# Patient Record
Sex: MALE | Race: WHITE | URBAN - METROPOLITAN AREA
[De-identification: names, ages, dates, MRNs, and addresses within clinical notes are randomized per-mention and may not be internally consistent; named-entity substitution may affect disease eponyms.]

---

## 2019-06-19 ENCOUNTER — APPOINTMENT (RX ONLY)
Dept: URBAN - METROPOLITAN AREA CLINIC 112 | Facility: CLINIC | Age: 29
Setting detail: DERMATOLOGY
End: 2019-06-19

## 2019-06-19 DIAGNOSIS — Z80.8 FAMILY HISTORY OF MALIGNANT NEOPLASM OF OTHER ORGANS OR SYSTEMS: ICD-10-CM

## 2019-06-19 DIAGNOSIS — L81.4 OTHER MELANIN HYPERPIGMENTATION: ICD-10-CM

## 2019-06-19 DIAGNOSIS — L73.2 HIDRADENITIS SUPPURATIVA: ICD-10-CM

## 2019-06-19 DIAGNOSIS — L30.4 ERYTHEMA INTERTRIGO: ICD-10-CM

## 2019-06-19 PROCEDURE — ? KOH PREP

## 2019-06-19 PROCEDURE — ? TREATMENT REGIMEN

## 2019-06-19 PROCEDURE — 99202 OFFICE O/P NEW SF 15 MIN: CPT

## 2019-06-19 PROCEDURE — ? PRESCRIPTION

## 2019-06-19 PROCEDURE — ? ADDITIONAL NOTES

## 2019-06-19 PROCEDURE — ? COUNSELING

## 2019-06-19 PROCEDURE — 87220 TISSUE EXAM FOR FUNGI: CPT

## 2019-06-19 RX ORDER — BENZOYL PEROXIDE 100 MG/ML
LIQUID TOPICAL
Qty: 1 | Refills: 0 | Status: ERX | COMMUNITY
Start: 2019-06-19

## 2019-06-19 RX ORDER — MINOCYCLINE HYDROCHLORIDE 100 MG/1
TABLET ORAL
Qty: 60 | Refills: 2 | Status: ERX | COMMUNITY
Start: 2019-06-19

## 2019-06-19 RX ORDER — TRIAMCINOLONE ACETONIDE 1 MG/G
CREAM TOPICAL
Qty: 1 | Refills: 0 | Status: ERX | COMMUNITY
Start: 2019-06-19

## 2019-06-19 RX ORDER — DAPSONE 50 MG/G
GEL TOPICAL
Qty: 1 | Refills: 1 | Status: ERX | COMMUNITY
Start: 2019-06-19

## 2019-06-19 RX ORDER — CICLOPIROX OLAMINE 7.7 MG/G
CREAM TOPICAL
Qty: 1 | Refills: 4 | Status: ERX | COMMUNITY
Start: 2019-06-19

## 2019-06-19 RX ADMIN — MINOCYCLINE HYDROCHLORIDE: 100 TABLET ORAL at 16:19

## 2019-06-19 RX ADMIN — CICLOPIROX OLAMINE: 7.7 CREAM TOPICAL at 16:24

## 2019-06-19 RX ADMIN — DAPSONE: 50 GEL TOPICAL at 16:30

## 2019-06-19 RX ADMIN — BENZOYL PEROXIDE: 100 LIQUID TOPICAL at 16:27

## 2019-06-19 RX ADMIN — TRIAMCINOLONE ACETONIDE: 1 CREAM TOPICAL at 16:23

## 2019-06-19 ASSESSMENT — LOCATION DETAILED DESCRIPTION DERM
LOCATION DETAILED: RIGHT ANTERIOR DISTAL UPPER ARM
LOCATION DETAILED: LEFT BUTTOCK
LOCATION DETAILED: LEFT ANTERIOR DISTAL UPPER ARM
LOCATION DETAILED: SUPERIOR THORACIC SPINE
LOCATION DETAILED: PERIUMBILICAL SKIN

## 2019-06-19 ASSESSMENT — LOCATION SIMPLE DESCRIPTION DERM
LOCATION SIMPLE: LEFT BUTTOCK
LOCATION SIMPLE: LEFT UPPER ARM
LOCATION SIMPLE: ABDOMEN
LOCATION SIMPLE: UPPER BACK
LOCATION SIMPLE: RIGHT UPPER ARM

## 2019-06-19 ASSESSMENT — LOCATION ZONE DERM
LOCATION ZONE: TRUNK
LOCATION ZONE: ARM

## 2019-06-19 NOTE — HPI: RASH
What Type Of Note Output Would You Prefer (Optional)?: Standard Output
How Severe Is Your Rash?: moderate
Is This A New Presentation, Or A Follow-Up?: Rash
Additional History: **Pt states he has his of staph after a tattoo he got a year ago. He had a abscess on the L buttock which was drained but was told staph wasn’t present but no cx was done. Pt states he has an itchy area on the abd which he apply hydrocortisone which helps with itching but it comes back. Pt also tried nystatin which did not help.

## 2019-06-19 NOTE — PROCEDURE: TREATMENT REGIMEN
Initiate Treatment: Minocycline 100mg
Otc Regimen: Panoxyl
Detail Level: Zone
Initiate Treatment: TAC, Loprox, Dapsone

## 2019-06-19 NOTE — PROCEDURE: KOH PREP
Koh Intro Text (From The.....): A KOH prep was ordered and evaluated from the
Detail Level: Detailed
Koh Procedure Text (Tissue Harvesting Technique): A 15-blade scalpel was used to scrape the skin. The skin scrapings were placed on a glass slide, covered with a coverslip and a KOH solution was applied. No evidence of scabies or mites.
Showing: fungal hyphal elements: negative

## 2020-01-05 ENCOUNTER — RX ONLY (RX ONLY)
Age: 30
End: 2020-01-05

## 2020-08-05 ENCOUNTER — APPOINTMENT (OUTPATIENT)
Dept: URBAN - METROPOLITAN AREA CLINIC 186 | Age: 30
Setting detail: DERMATOLOGY
End: 2020-08-05

## 2020-08-05 VITALS — TEMPERATURE: 97.8 F

## 2020-08-05 DIAGNOSIS — B35.4 TINEA CORPORIS: ICD-10-CM

## 2020-08-05 DIAGNOSIS — T49.0X5 ADVERSE EFFECT OF LOCAL ANTIFUNGAL, ANTI-INFECTIVE AND ANTI-INFLAMMATORY DRUGS: ICD-10-CM

## 2020-08-05 PROBLEM — T49.0X5A ADVERSE EFFECT OF LOCAL ANTIFUNGAL, ANTI-INFECTIVE AND ANTI-INFLAMMATORY DRUGS, INITIAL ENCOUNTER: Status: ACTIVE | Noted: 2020-08-05

## 2020-08-05 PROCEDURE — 87220 TISSUE EXAM FOR FUNGI: CPT

## 2020-08-05 PROCEDURE — OTHER PRESCRIPTION: OTHER

## 2020-08-05 PROCEDURE — OTHER DIAGNOSIS COMMENT: OTHER

## 2020-08-05 PROCEDURE — OTHER TREATMENT REGIMEN: OTHER

## 2020-08-05 PROCEDURE — OTHER ADDITIONAL NOTES: OTHER

## 2020-08-05 PROCEDURE — OTHER KOH PREP: OTHER

## 2020-08-05 PROCEDURE — OTHER COUNSELING: OTHER

## 2020-08-05 PROCEDURE — 99202 OFFICE O/P NEW SF 15 MIN: CPT

## 2020-08-05 RX ORDER — OXICONAZOLE NITRATE 10 MG/G
CREAM TOPICAL
Qty: 1 | Refills: 2 | Status: ERX | COMMUNITY
Start: 2020-08-05

## 2020-08-05 RX ORDER — TERBINAFINE HYDROCHLORIDE 250 MG/1
TABLET ORAL
Qty: 28 | Refills: 0 | Status: ERX | COMMUNITY
Start: 2020-08-05

## 2020-08-05 ASSESSMENT — LOCATION ZONE DERM: LOCATION ZONE: TRUNK

## 2020-08-05 ASSESSMENT — LOCATION DETAILED DESCRIPTION DERM: LOCATION DETAILED: SUPRAPUBIC SKIN

## 2020-08-05 ASSESSMENT — LOCATION SIMPLE DESCRIPTION DERM: LOCATION SIMPLE: GROIN

## 2020-08-05 NOTE — PROCEDURE: DIAGNOSIS COMMENT
Detail Level: Detailed
Comment: Long term over use of triamcinolone cream, reviewed side effects and positive KOH results confirming tinea corporis.  Due to long term  use  I suggested oral terbinafine in addition  to topical ketoconazole cream.

## 2020-08-05 NOTE — PROCEDURE: TREATMENT REGIMEN
Detail Level: Zone
Discontinue Regimen: Triamcinolone
Initiate Treatment: Oxiconazole once daily \\nTerbinifine 250 mg once daily x 14 days

## 2020-09-09 RX ORDER — OXICONAZOLE NITRATE 10 MG/G
CREAM TOPICAL
Qty: 1 | Refills: 1 | Status: ERX

## 2024-03-27 NOTE — PROGRESS NOTES
Subjective   Patient ID: Angelito Burgos is a 33 y.o. male who presents for No chief complaint on file..  HPI  BARIATRIC CONSULT  Prefers TJ  START WT:   286    IDEAL WT: 163     START EXCESS:     123    HT: 67.5 in  YRS OF OBESITY 6  GREATEST WT LOSS IN LBS:60  PROGRAMS FOR WT LOSS IN THE PAST : EXERCISE, LOW CALORIE, LOW FAT  MEDICATION: NEXIUM OTC DAILY/ does not take for the past 2 wks  DOES NOT HAVE A PCP    Works in office work  Review of Systems  Allergy/Immunologic:          HIV / AIDS No.  Hepatitis A No.  Hepatitis B No.  Hepatitis C No.  Immunosuppressent drugs No.         HEENT:          Headache MIGRAINES YES. ADMITS TO BLURRED VISION, ADMITS TO FATIGUE     CARDIOLOGY:          History of Hyperlipidemia No.  Last stress test N/A.  Last echocardiogram N/A.  Chest pain No.  High blood pressure No.  Irregular heart beat No.  Known coronary artery disease No.  Pacemaker No.  Palpitations No.         RESPIRATORY:          Hx steroid use No.  ER visits or Hospitalizations for breathing problems No.  Sleep Apnea SNORING, DAYTIME SLEEPINESS  COOK (dyspnea on exertion) YES   .  Hx of Asthma/COPD No.         GASTROENTEROLOGY:          Peptic ulcer No, Last EGD N/A, Last UGI N/A.  Colonoscopy  Last Colonoscopy N/A.  Heartburn YES          ENDOCRINOLOGY:          Diabetes No.  Thyroid disorder No.         EXTREMITIES:          Varicose Veins No.  Stasis Ulcers No.  Ankle swelling YES     Personal history DVT No.  Personal history PE No.  Personal history of other thrombolic events No.  Family history of VTE No.  Known genetic bleeding or clotting disorder No.         UROLOGY:          Kidney disease No.  Kidney stones No.  Previous UTIs No.  Urinary incontinence No.  ADMITS TO FREQUENT URINATION       MUSCULOSKELETAL:          Osteoporosis/Osteopenia No.  Arthritis No.  Joint pain YES, FOOT PAIN, KNEE PAIN         SKIN:          Hidradenitis No.  Open skin wounds No.  Rosacea No.  Healing problems No.          PSYCHOLOGY:          Anxiety none.  Depression none.  Eating disorder denies.       Objective   Physical Exam    Assessment/Plan            Delaney Alvarado LPN 03/27/24 8:59 AM

## 2024-04-19 ASSESSMENT — LIFESTYLE VARIABLES
AUDIT-C TOTAL SCORE: 1
SKIP TO QUESTIONS 9-10: 1
HOW MANY STANDARD DRINKS CONTAINING ALCOHOL DO YOU HAVE ON A TYPICAL DAY: 1 OR 2
HOW OFTEN DO YOU HAVE A DRINK CONTAINING ALCOHOL: MONTHLY OR LESS
HOW OFTEN DO YOU HAVE SIX OR MORE DRINKS ON ONE OCCASION: NEVER

## 2024-04-25 ENCOUNTER — LAB (OUTPATIENT)
Dept: LAB | Facility: LAB | Age: 34
End: 2024-04-25
Payer: COMMERCIAL

## 2024-04-25 ENCOUNTER — OFFICE VISIT (OUTPATIENT)
Dept: SURGERY | Facility: CLINIC | Age: 34
End: 2024-04-25
Payer: COMMERCIAL

## 2024-04-25 ENCOUNTER — NUTRITION (OUTPATIENT)
Dept: SURGERY | Facility: CLINIC | Age: 34
End: 2024-04-25
Payer: COMMERCIAL

## 2024-04-25 VITALS
BODY MASS INDEX: 43.35 KG/M2 | DIASTOLIC BLOOD PRESSURE: 97 MMHG | SYSTOLIC BLOOD PRESSURE: 160 MMHG | HEIGHT: 68 IN | HEART RATE: 83 BPM | WEIGHT: 286 LBS

## 2024-04-25 VITALS
HEART RATE: 83 BPM | BODY MASS INDEX: 43.35 KG/M2 | HEIGHT: 68 IN | DIASTOLIC BLOOD PRESSURE: 97 MMHG | SYSTOLIC BLOOD PRESSURE: 160 MMHG | WEIGHT: 286 LBS | RESPIRATION RATE: 16 BRPM

## 2024-04-25 DIAGNOSIS — K21.9 GERD WITHOUT ESOPHAGITIS: Primary | ICD-10-CM

## 2024-04-25 DIAGNOSIS — G47.33 OBSTRUCTIVE SLEEP APNEA: ICD-10-CM

## 2024-04-25 DIAGNOSIS — E53.8 B12 DEFICIENCY: ICD-10-CM

## 2024-04-25 DIAGNOSIS — E61.0 COPPER DEFICIENCY: ICD-10-CM

## 2024-04-25 DIAGNOSIS — K21.9 GASTROESOPHAGEAL REFLUX DISEASE WITHOUT ESOPHAGITIS: ICD-10-CM

## 2024-04-25 DIAGNOSIS — Z01.818 PRE-OP EVALUATION: ICD-10-CM

## 2024-04-25 DIAGNOSIS — I10 PRIMARY HYPERTENSION: ICD-10-CM

## 2024-04-25 DIAGNOSIS — E55.9 VITAMIN D DEFICIENCY: ICD-10-CM

## 2024-04-25 DIAGNOSIS — E63.9 NUTRITIONAL DISORDER: ICD-10-CM

## 2024-04-25 DIAGNOSIS — D68.9 COAGULATION DISORDER (MULTI): ICD-10-CM

## 2024-04-25 DIAGNOSIS — Z71.3 ENCOUNTER FOR NUTRITION EVALUATION PRIOR TO BARIATRIC SURGERY: ICD-10-CM

## 2024-04-25 DIAGNOSIS — E51.9 THIAMINE DEFICIENCY: ICD-10-CM

## 2024-04-25 DIAGNOSIS — R73.9 HYPERGLYCEMIA: ICD-10-CM

## 2024-04-25 DIAGNOSIS — R40.0 DAYTIME SOMNOLENCE: ICD-10-CM

## 2024-04-25 DIAGNOSIS — E07.9 DISEASE OF THYROID GLAND: ICD-10-CM

## 2024-04-25 DIAGNOSIS — E66.01 MORBID OBESITY WITH BMI OF 40.0-44.9, ADULT (MULTI): Primary | ICD-10-CM

## 2024-04-25 DIAGNOSIS — D50.8 IRON DEFICIENCY ANEMIA SECONDARY TO INADEQUATE DIETARY IRON INTAKE: ICD-10-CM

## 2024-04-25 DIAGNOSIS — R03.0 ELEVATED BLOOD PRESSURE READING: ICD-10-CM

## 2024-04-25 LAB
25(OH)D3 SERPL-MCNC: 8 NG/ML (ref 31–100)
ALBUMIN SERPL-MCNC: 4.3 G/DL (ref 3.5–5)
ALP BLD-CCNC: 58 U/L (ref 35–125)
ALT SERPL-CCNC: 90 U/L (ref 5–40)
ANION GAP SERPL CALC-SCNC: 16 MMOL/L
APTT PPP: 29.4 SECONDS (ref 22–32.5)
AST SERPL-CCNC: 67 U/L (ref 5–40)
BASOPHILS # BLD AUTO: 0.04 X10*3/UL (ref 0–0.1)
BASOPHILS NFR BLD AUTO: 0.5 %
BILIRUB SERPL-MCNC: 0.6 MG/DL (ref 0.1–1.2)
BUN SERPL-MCNC: 11 MG/DL (ref 8–25)
CALCIUM SERPL-MCNC: 9.6 MG/DL (ref 8.5–10.4)
CHLORIDE SERPL-SCNC: 99 MMOL/L (ref 97–107)
CHOLEST SERPL-MCNC: 245 MG/DL (ref 133–200)
CHOLEST/HDLC SERPL: 6.6 {RATIO}
CO2 SERPL-SCNC: 24 MMOL/L (ref 24–31)
CREAT SERPL-MCNC: 0.8 MG/DL (ref 0.4–1.6)
EGFRCR SERPLBLD CKD-EPI 2021: >90 ML/MIN/1.73M*2
EOSINOPHIL # BLD AUTO: 0.1 X10*3/UL (ref 0–0.7)
EOSINOPHIL NFR BLD AUTO: 1.2 %
ERYTHROCYTE [DISTWIDTH] IN BLOOD BY AUTOMATED COUNT: 12.4 % (ref 11.5–14.5)
EST. AVERAGE GLUCOSE BLD GHB EST-MCNC: 140 MG/DL
FERRITIN SERPL-MCNC: 177 NG/ML (ref 30–400)
FOLATE SERPL-MCNC: 11.2 NG/ML (ref 4.2–19.9)
GLUCOSE SERPL-MCNC: 94 MG/DL (ref 65–99)
HBA1C MFR BLD: 6.5 %
HCT VFR BLD AUTO: 44.4 % (ref 41–52)
HDLC SERPL-MCNC: 37 MG/DL
HGB BLD-MCNC: 14.9 G/DL (ref 13.5–17.5)
IMM GRANULOCYTES # BLD AUTO: 0.04 X10*3/UL (ref 0–0.7)
IMM GRANULOCYTES NFR BLD AUTO: 0.5 % (ref 0–0.9)
INR PPP: 1.1 (ref 0.9–1.2)
IRON SATN MFR SERPL: 28 % (ref 12–50)
IRON SERPL-MCNC: 106 UG/DL (ref 45–160)
LDLC SERPL CALC-MCNC: 158 MG/DL (ref 65–130)
LYMPHOCYTES # BLD AUTO: 2.97 X10*3/UL (ref 1.2–4.8)
LYMPHOCYTES NFR BLD AUTO: 36.4 %
MCH RBC QN AUTO: 28.8 PG (ref 26–34)
MCHC RBC AUTO-ENTMCNC: 33.6 G/DL (ref 32–36)
MCV RBC AUTO: 86 FL (ref 80–100)
MONOCYTES # BLD AUTO: 0.52 X10*3/UL (ref 0.1–1)
MONOCYTES NFR BLD AUTO: 6.4 %
NEUTROPHILS # BLD AUTO: 4.5 X10*3/UL (ref 1.2–7.7)
NEUTROPHILS NFR BLD AUTO: 55 %
NRBC BLD-RTO: 0 /100 WBCS (ref 0–0)
PLATELET # BLD AUTO: 296 X10*3/UL (ref 150–450)
POTASSIUM SERPL-SCNC: 4.2 MMOL/L (ref 3.4–5.1)
PROT SERPL-MCNC: 7.5 G/DL (ref 5.9–7.9)
PROTHROMBIN TIME: 11.1 SECONDS (ref 9.3–12.7)
PTH-INTACT SERPL-MCNC: 39.5 PG/ML (ref 18.5–88)
RBC # BLD AUTO: 5.17 X10*6/UL (ref 4.5–5.9)
SODIUM SERPL-SCNC: 139 MMOL/L (ref 133–145)
T4 FREE SERPL-MCNC: 0.8 NG/DL (ref 0.9–1.7)
TIBC SERPL-MCNC: 379 UG/DL (ref 228–428)
TRIGL SERPL-MCNC: 248 MG/DL (ref 40–150)
TSH SERPL DL<=0.05 MIU/L-ACNC: 9.86 MIU/L (ref 0.27–4.2)
UIBC SERPL-MCNC: 273 UG/DL (ref 110–370)
VIT B12 SERPL-MCNC: 488 PG/ML (ref 211–946)
WBC # BLD AUTO: 8.2 X10*3/UL (ref 4.4–11.3)

## 2024-04-25 PROCEDURE — 82607 VITAMIN B-12: CPT

## 2024-04-25 PROCEDURE — 3008F BODY MASS INDEX DOCD: CPT | Performed by: SURGERY

## 2024-04-25 PROCEDURE — 85730 THROMBOPLASTIN TIME PARTIAL: CPT

## 2024-04-25 PROCEDURE — 3080F DIAST BP >= 90 MM HG: CPT | Performed by: INTERNAL MEDICINE

## 2024-04-25 PROCEDURE — 84630 ASSAY OF ZINC: CPT

## 2024-04-25 PROCEDURE — 82746 ASSAY OF FOLIC ACID SERUM: CPT

## 2024-04-25 PROCEDURE — 82728 ASSAY OF FERRITIN: CPT

## 2024-04-25 PROCEDURE — 84590 ASSAY OF VITAMIN A: CPT

## 2024-04-25 PROCEDURE — 83540 ASSAY OF IRON: CPT

## 2024-04-25 PROCEDURE — 36415 COLL VENOUS BLD VENIPUNCTURE: CPT

## 2024-04-25 PROCEDURE — 84425 ASSAY OF VITAMIN B-1: CPT

## 2024-04-25 PROCEDURE — 83970 ASSAY OF PARATHORMONE: CPT

## 2024-04-25 PROCEDURE — 93000 ELECTROCARDIOGRAM COMPLETE: CPT | Performed by: INTERNAL MEDICINE

## 2024-04-25 PROCEDURE — 3077F SYST BP >= 140 MM HG: CPT | Performed by: INTERNAL MEDICINE

## 2024-04-25 PROCEDURE — 80061 LIPID PANEL: CPT

## 2024-04-25 PROCEDURE — 85610 PROTHROMBIN TIME: CPT

## 2024-04-25 PROCEDURE — 82525 ASSAY OF COPPER: CPT

## 2024-04-25 PROCEDURE — 84443 ASSAY THYROID STIM HORMONE: CPT

## 2024-04-25 PROCEDURE — 85025 COMPLETE CBC W/AUTO DIFF WBC: CPT

## 2024-04-25 PROCEDURE — 83550 IRON BINDING TEST: CPT

## 2024-04-25 PROCEDURE — 3008F BODY MASS INDEX DOCD: CPT | Performed by: INTERNAL MEDICINE

## 2024-04-25 PROCEDURE — 82306 VITAMIN D 25 HYDROXY: CPT

## 2024-04-25 PROCEDURE — 84446 ASSAY OF VITAMIN E: CPT

## 2024-04-25 PROCEDURE — 83036 HEMOGLOBIN GLYCOSYLATED A1C: CPT

## 2024-04-25 PROCEDURE — 80053 COMPREHEN METABOLIC PANEL: CPT

## 2024-04-25 PROCEDURE — 99204 OFFICE O/P NEW MOD 45 MIN: CPT | Performed by: INTERNAL MEDICINE

## 2024-04-25 PROCEDURE — 84439 ASSAY OF FREE THYROXINE: CPT

## 2024-04-25 PROCEDURE — 99204 OFFICE O/P NEW MOD 45 MIN: CPT | Performed by: SURGERY

## 2024-04-25 RX ORDER — LEVOTHYROXINE SODIUM 50 UG/1
50 TABLET ORAL DAILY
Qty: 90 TABLET | Refills: 1 | Status: SHIPPED | OUTPATIENT
Start: 2024-04-25 | End: 2025-04-25

## 2024-04-25 RX ORDER — TRIAMTERENE/HYDROCHLOROTHIAZID 37.5-25 MG
1 TABLET ORAL DAILY
Qty: 30 TABLET | Refills: 5 | Status: SHIPPED | OUTPATIENT
Start: 2024-04-25 | End: 2024-10-22

## 2024-04-25 RX ORDER — CHOLECALCIFEROL (VITAMIN D3) 1250 MCG
50000 TABLET ORAL
Qty: 12 TABLET | Refills: 1 | Status: SHIPPED | OUTPATIENT
Start: 2024-04-25 | End: 2025-04-25

## 2024-04-25 RX ORDER — AMLODIPINE BESYLATE 5 MG/1
5 TABLET ORAL DAILY
Qty: 30 TABLET | Refills: 5 | Status: SHIPPED | OUTPATIENT
Start: 2024-04-25 | End: 2025-04-25

## 2024-04-25 ASSESSMENT — ENCOUNTER SYMPTOMS
SHORTNESS OF BREATH: 1
OCCASIONAL FEELINGS OF UNSTEADINESS: 0
FEVER: 0
DIARRHEA: 0
DIFFICULTY URINATING: 0
NAUSEA: 0
WEAKNESS: 0
DEPRESSION: 0
CONSTIPATION: 0
DIZZINESS: 1
CHILLS: 0
ROS GI COMMENTS: ACID REFLUX
FATIGUE: 1
BACK PAIN: 1
HEADACHES: 1
COUGH: 0
LOSS OF SENSATION IN FEET: 0
ABDOMINAL PAIN: 0
ARTHRALGIAS: 1

## 2024-04-25 ASSESSMENT — PATIENT HEALTH QUESTIONNAIRE - PHQ9
SUM OF ALL RESPONSES TO PHQ9 QUESTIONS 1 AND 2: 0
2. FEELING DOWN, DEPRESSED OR HOPELESS: NOT AT ALL
2. FEELING DOWN, DEPRESSED OR HOPELESS: NOT AT ALL
1. LITTLE INTEREST OR PLEASURE IN DOING THINGS: NOT AT ALL
SUM OF ALL RESPONSES TO PHQ9 QUESTIONS 1 AND 2: 0
1. LITTLE INTEREST OR PLEASURE IN DOING THINGS: NOT AT ALL

## 2024-04-25 ASSESSMENT — COLUMBIA-SUICIDE SEVERITY RATING SCALE - C-SSRS
6. HAVE YOU EVER DONE ANYTHING, STARTED TO DO ANYTHING, OR PREPARED TO DO ANYTHING TO END YOUR LIFE?: NO
2. HAVE YOU ACTUALLY HAD ANY THOUGHTS OF KILLING YOURSELF?: NO
1. IN THE PAST MONTH, HAVE YOU WISHED YOU WERE DEAD OR WISHED YOU COULD GO TO SLEEP AND NOT WAKE UP?: NO

## 2024-04-25 ASSESSMENT — PAIN SCALES - GENERAL
PAINLEVEL: 0-NO PAIN
PAINLEVEL: 0-NO PAIN

## 2024-04-25 NOTE — PROGRESS NOTES
"Initial Medical Weight Loss Appointment ([facility])     Starting Weight: 286 lbs   Current BMI: 44.13     Weight History / Diet Experience: Tennis reports that he has gained a significant amount of weight over the past 5-6 months. Tennis states that around 5-6 months ago he was around 215 lbs. He is now 286 lbs. He has been experiencing undesirable symptoms since significant weight gain. He has reflux daily, and sometimes acid will come back up. Nexium medication no longer is working, so is not taking. He has poor energy levels throughout the day, sometimes he feels like he may pass out. Pt does not sleep well and wakes up several times throughout the night. If he goes long periods without eating, he will get very shaky, dizzy and light headed - this has increased over the past 3-4 months. He has a strong craving for sugar. Angelito states that he is constantly thirsty, always experiences dry mouth. He has not seen a Doctor in over 4 years. Tennis states that he has a family history of diabetes, heart disease (congestive heart failure), lung cancer. His father has T2DM and kidney disease.    Tennis has not tried to lose weight before. He has never worked with a dietitian. 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 He has also significantly increased his snacking.   Pt Seeking: unsure  Pertinent Co-morbidities: none that he is aware of at this time.   Current Daily Intake: 3 meals daily   Breakfast (7:15-7:30am) Usually has a bowl of cereal with 2% milk first thing in the morning, because he feels like he needs something right when he gets up. Then on his way to work will stop at [employer] and grab an egg McMuffin and a Dr. Pepper with a regular red bull - reports \"needs this to stay awake\"   Lunch (12:30-1pm) Gets some kind of Fast food. [employer] is his \"go to\" and orders breaded chicken sandwich or entree with a sweet tea. " "Other fast food places he may order from include: [employer], [employer], [employer], [employer]    Dinner (5:45pm) - Usually him and his partner go out to get something 5+ days out of the week. Common places include: [employer], [employer] or some kind of Chinese food, Mexican food, sometimes will throw a pizza in the oven   How many times do you order takeout, fast food and/or go out to eat per week? Daily   Snacks: Yes. Snacks throughout the day - will go to [employer] and get peanut M&M's, sweet tarts, suckers, \"craves a lot of sugar\" In the evenings when watching TV after dinner, pt usually has a glass of sweet tea and 8 buffalo chicken wings.     Beverages: 1-2 Dr. Pepper daily, Sweet Tea every night, red bull 4-5 per week, 1-2 cups of 2% milk daily, regular snapple tea ~3 bottles per week. Over the past month, Tennis started drinking more water.   Alcohol Intake: very rarely, at dinner may order 1 glass of wine   Food Allergies? NKFAs   Food Intolerances? Spicy foods and Mexican food make him run to the bathroom immediately and aggravates heart burn symptoms.   Food Dislikes? Not that he is aware of   Do you eat when you are not hungry and/or when you feel full? Yes   Do you eat when you are bored/stressed/emotional? Yes   Current Exercise/Exercise Hx: minimal right now. Since recent weight gain, his back and hips have been hurting and experiencing joint pain in his knees. Pt does walk his dog around his apartment complex daily (0.25-0.33 miles). Has to sit down afterwards.   Hours of sleep per night: Maybe 4-5 hours per night. Wakes up throughout the night, having a hard time staying asleep. His father has sleep apnea.   Work schedule: sedentary job now   Who is in the household? Him and his boyfriend. His 11 year old twins (son + daughter) live with him half the time.   Who does the cooking/grocery shopping? [employer] for grocery shopping. Both will help cook and do chores. "   Obstacles to weight loss in the past? Excessive consumption of sugar and high calorie foods    Anything else relative to diet?   What do you want to get out of surgery? Wants to be healthier for his kids. No longer has energy and feels horrible. Wants to be able to do physical activities with his kids including [place]. Also wants to prevent chronic diseases down the road, that are related to obesity.   Motivation to change (1-10): Feeling motivated     Estimated ability to achieve goals: good     Today's Lesson: Review of Dr. Shaver's lifelong rules, calorie counting, food label reading, promoting feelings of satiety, and energy balance.  Diet Goals: Practice the lifelong rules  Exercise Recommendations: Gradually work up to 150 minutes of moderate intensity exercise per week.  Behavior Changes: will be assessed every month  Goals for the month:   - Gradually reduce sugary beverage consumption week by week   - Reduce fast food / takeout. Limit to 3X per week or less    Plan: Will follow up next month. Will continue to monitor pt's weight, dietary & behavior changes.      Laura Fernandez, MS, RD, LD

## 2024-04-25 NOTE — PROGRESS NOTES
"Subjective   Patient ID: Angelito Burgos is a 33 y.o. male who presents for [facility] visit 1. Patient has been trying to lose weight for many years by following different diets like Weight Watchers, lost some weight but did not maintain. Currently has 3 meals a day. Breakfast includes fast food. Lunch consists of fast food or restaurant. For dinner, he eats out. Snacks on candy, crackers. Drinks a lot of soda, sweet tea. Regarding exercise, he is limited due to fatigue. Denies previous sleep study. Denies personal hx of blood clots. Patient feels jittery if he goes to long without eating something. Reports dizziness and headaches. His legs swell (L>R). Also reports acid reflux and shortness of breath. C/o low back pain and knee pain.    Diagnostics Reviewed: 4/2024 EKG NSR  Labs Reviewed:         Review of Systems   Constitutional:  Positive for fatigue. Negative for chills and fever.        Excessive daytime somnolence   HENT:  Negative for dental problem.    Respiratory:  Positive for shortness of breath. Negative for cough.    Cardiovascular:  Positive for leg swelling.   Gastrointestinal:  Negative for abdominal pain, constipation, diarrhea and nausea.        Acid reflux   Genitourinary:  Negative for difficulty urinating.   Musculoskeletal:  Positive for arthralgias and back pain.   Neurological:  Positive for dizziness and headaches. Negative for weakness.       Objective   BP (!) 160/97   Pulse 83   Resp 16   Ht 1.715 m (5' 7.5\")   Wt 130 kg (286 lb)   BMI 44.13 kg/m²     Physical Exam  Constitutional:       General: He is not in acute distress.     Appearance: He is obese.   HENT:      Mouth/Throat:      Comments: Dentition WNL  Cardiovascular:      Rate and Rhythm: Normal rate and regular rhythm.      Heart sounds: Normal heart sounds.   Pulmonary:      Breath sounds: Normal breath sounds.   Abdominal:      Palpations: Abdomen is soft.      Tenderness: There is no abdominal tenderness.   Musculoskeletal:      " Cervical back: No tenderness.      Right lower leg: No edema.      Left lower leg: No edema.   Lymphadenopathy:      Cervical: No cervical adenopathy.   Skin:     General: Skin is warm.      Findings: No erythema.   Neurological:      Mental Status: He is alert and oriented to person, place, and time.      Gait: Gait is intact. Gait normal.   Psychiatric:         Mood and Affect: Mood normal.         Behavior: Behavior normal.         Assessment/Plan   Diagnoses and all orders for this visit:  GERD without esophagitis  -     H. Pylori Antigen, Stool; Future  Pre-op evaluation  -     ECG 12 lead (Clinic Performed)  -     Home sleep apnea test (HSAT); Future  -     CBC and Auto Differential; Future  -     aPTT; Future  -     Hemoglobin A1C; Future  -     Folate; Future  -     Ferritin; Future  -     Comprehensive Metabolic Panel; Future  -     TSH with reflex to Free T4 if abnormal; Future  -     Lipid Panel; Future  -     Protime-INR; Future  -     Vitamin D 25-Hydroxy,Total (for eval of Vitamin D levels); Future  -     Vitamin B12; Future  -     Parathyroid Hormone, Intact; Future  -     Vitamin B1, Whole Blood; Future  -     Copper, Blood; Future  -     Vitamin A; Future  -     Vitamin E; Future  -     Zinc, Serum or Plasma; Future  -     Iron and TIBC; Future  Coagulation disorder (Multi)  -     Home sleep apnea test (HSAT); Future  -     CBC and Auto Differential; Future  -     aPTT; Future  -     Hemoglobin A1C; Future  -     Folate; Future  -     Ferritin; Future  -     Comprehensive Metabolic Panel; Future  -     TSH with reflex to Free T4 if abnormal; Future  -     Lipid Panel; Future  -     Protime-INR; Future  -     Vitamin D 25-Hydroxy,Total (for eval of Vitamin D levels); Future  -     Vitamin B12; Future  -     Parathyroid Hormone, Intact; Future  -     Vitamin B1, Whole Blood; Future  -     Copper, Blood; Future  -     Vitamin A; Future  -     Vitamin E; Future  -     Zinc, Serum or Plasma; Future  -      Iron and TIBC; Future  Copper deficiency  -     Home sleep apnea test (HSAT); Future  -     CBC and Auto Differential; Future  -     aPTT; Future  -     Hemoglobin A1C; Future  -     Folate; Future  -     Ferritin; Future  -     Comprehensive Metabolic Panel; Future  -     TSH with reflex to Free T4 if abnormal; Future  -     Lipid Panel; Future  -     Protime-INR; Future  -     Vitamin D 25-Hydroxy,Total (for eval of Vitamin D levels); Future  -     Vitamin B12; Future  -     Parathyroid Hormone, Intact; Future  -     Vitamin B1, Whole Blood; Future  -     Copper, Blood; Future  -     Vitamin A; Future  -     Vitamin E; Future  -     Zinc, Serum or Plasma; Future  -     Iron and TIBC; Future  Encounter for nutrition evaluation prior to bariatric surgery  -     Home sleep apnea test (HSAT); Future  -     CBC and Auto Differential; Future  -     aPTT; Future  -     Hemoglobin A1C; Future  -     Folate; Future  -     Ferritin; Future  -     Comprehensive Metabolic Panel; Future  -     TSH with reflex to Free T4 if abnormal; Future  -     Lipid Panel; Future  -     Protime-INR; Future  -     Vitamin D 25-Hydroxy,Total (for eval of Vitamin D levels); Future  -     Vitamin B12; Future  -     Parathyroid Hormone, Intact; Future  -     Vitamin B1, Whole Blood; Future  -     Copper, Blood; Future  -     Vitamin A; Future  -     Vitamin E; Future  -     Zinc, Serum or Plasma; Future  -     Iron and TIBC; Future  Hyperglycemia  -     Home sleep apnea test (HSAT); Future  -     CBC and Auto Differential; Future  -     aPTT; Future  -     Hemoglobin A1C; Future  -     Folate; Future  -     Ferritin; Future  -     Comprehensive Metabolic Panel; Future  -     TSH with reflex to Free T4 if abnormal; Future  -     Lipid Panel; Future  -     Protime-INR; Future  -     Vitamin D 25-Hydroxy,Total (for eval of Vitamin D levels); Future  -     Vitamin B12; Future  -     Parathyroid Hormone, Intact; Future  -     Vitamin B1, Whole  Blood; Future  -     Copper, Blood; Future  -     Vitamin A; Future  -     Vitamin E; Future  -     Zinc, Serum or Plasma; Future  -     Iron and TIBC; Future  B12 deficiency  -     Home sleep apnea test (HSAT); Future  -     CBC and Auto Differential; Future  -     aPTT; Future  -     Hemoglobin A1C; Future  -     Folate; Future  -     Ferritin; Future  -     Comprehensive Metabolic Panel; Future  -     TSH with reflex to Free T4 if abnormal; Future  -     Lipid Panel; Future  -     Protime-INR; Future  -     Vitamin D 25-Hydroxy,Total (for eval of Vitamin D levels); Future  -     Vitamin B12; Future  -     Parathyroid Hormone, Intact; Future  -     Vitamin B1, Whole Blood; Future  -     Copper, Blood; Future  -     Vitamin A; Future  -     Vitamin E; Future  -     Zinc, Serum or Plasma; Future  -     Iron and TIBC; Future  Disease of thyroid gland  -     Home sleep apnea test (HSAT); Future  -     CBC and Auto Differential; Future  -     aPTT; Future  -     Hemoglobin A1C; Future  -     Folate; Future  -     Ferritin; Future  -     Comprehensive Metabolic Panel; Future  -     TSH with reflex to Free T4 if abnormal; Future  -     Lipid Panel; Future  -     Protime-INR; Future  -     Vitamin D 25-Hydroxy,Total (for eval of Vitamin D levels); Future  -     Vitamin B12; Future  -     Parathyroid Hormone, Intact; Future  -     Vitamin B1, Whole Blood; Future  -     Copper, Blood; Future  -     Vitamin A; Future  -     Vitamin E; Future  -     Zinc, Serum or Plasma; Future  -     Iron and TIBC; Future  Iron deficiency anemia secondary to inadequate dietary iron intake  -     Home sleep apnea test (HSAT); Future  -     CBC and Auto Differential; Future  -     aPTT; Future  -     Hemoglobin A1C; Future  -     Folate; Future  -     Ferritin; Future  -     Comprehensive Metabolic Panel; Future  -     TSH with reflex to Free T4 if abnormal; Future  -     Lipid Panel; Future  -     Protime-INR; Future  -     Vitamin D  25-Hydroxy,Total (for eval of Vitamin D levels); Future  -     Vitamin B12; Future  -     Parathyroid Hormone, Intact; Future  -     Vitamin B1, Whole Blood; Future  -     Copper, Blood; Future  -     Vitamin A; Future  -     Vitamin E; Future  -     Zinc, Serum or Plasma; Future  -     Iron and TIBC; Future  Nutritional disorder  -     Home sleep apnea test (HSAT); Future  -     CBC and Auto Differential; Future  -     aPTT; Future  -     Hemoglobin A1C; Future  -     Folate; Future  -     Ferritin; Future  -     Comprehensive Metabolic Panel; Future  -     TSH with reflex to Free T4 if abnormal; Future  -     Lipid Panel; Future  -     Protime-INR; Future  -     Vitamin D 25-Hydroxy,Total (for eval of Vitamin D levels); Future  -     Vitamin B12; Future  -     Parathyroid Hormone, Intact; Future  -     Vitamin B1, Whole Blood; Future  -     Copper, Blood; Future  -     Vitamin A; Future  -     Vitamin E; Future  -     Zinc, Serum or Plasma; Future  -     Iron and TIBC; Future  Obstructive sleep apnea  -     Home sleep apnea test (HSAT); Future  -     CBC and Auto Differential; Future  -     aPTT; Future  -     Hemoglobin A1C; Future  -     Folate; Future  -     Ferritin; Future  -     Comprehensive Metabolic Panel; Future  -     TSH with reflex to Free T4 if abnormal; Future  -     Lipid Panel; Future  -     Protime-INR; Future  -     Vitamin D 25-Hydroxy,Total (for eval of Vitamin D levels); Future  -     Vitamin B12; Future  -     Parathyroid Hormone, Intact; Future  -     Vitamin B1, Whole Blood; Future  -     Copper, Blood; Future  -     Vitamin A; Future  -     Vitamin E; Future  -     Zinc, Serum or Plasma; Future  -     Iron and TIBC; Future  Thiamine deficiency  -     Home sleep apnea test (HSAT); Future  -     CBC and Auto Differential; Future  -     aPTT; Future  -     Hemoglobin A1C; Future  -     Folate; Future  -     Ferritin; Future  -     Comprehensive Metabolic Panel; Future  -     TSH with reflex  to Free T4 if abnormal; Future  -     Lipid Panel; Future  -     Protime-INR; Future  -     Vitamin D 25-Hydroxy,Total (for eval of Vitamin D levels); Future  -     Vitamin B12; Future  -     Parathyroid Hormone, Intact; Future  -     Vitamin B1, Whole Blood; Future  -     Copper, Blood; Future  -     Vitamin A; Future  -     Vitamin E; Future  -     Zinc, Serum or Plasma; Future  -     Iron and TIBC; Future  Vitamin D deficiency  -     Home sleep apnea test (HSAT); Future  -     CBC and Auto Differential; Future  -     aPTT; Future  -     Hemoglobin A1C; Future  -     Folate; Future  -     Ferritin; Future  -     Comprehensive Metabolic Panel; Future  -     TSH with reflex to Free T4 if abnormal; Future  -     Lipid Panel; Future  -     Protime-INR; Future  -     Vitamin D 25-Hydroxy,Total (for eval of Vitamin D levels); Future  -     Vitamin B12; Future  -     Parathyroid Hormone, Intact; Future  -     Vitamin B1, Whole Blood; Future  -     Copper, Blood; Future  -     Vitamin A; Future  -     Vitamin E; Future  -     Zinc, Serum or Plasma; Future  -     Iron and TIBC; Future  Primary hypertension  -     triamterene-hydrochlorothiazid (Maxzide-25) 37.5-25 mg tablet; Take 1 tablet by mouth once daily.  -     amLODIPine (Norvasc) 5 mg tablet; Take 1 tablet (5 mg) by mouth once daily.      Scribe Attestation  By signing my name below, IStacy, Scribumm   attest that this documentation has been prepared under the direction and in the presence of Yoly Hyde MD.

## 2024-04-26 PROBLEM — R03.0 ELEVATED BLOOD PRESSURE READING: Status: ACTIVE | Noted: 2024-04-26

## 2024-04-26 PROBLEM — K21.9 GASTROESOPHAGEAL REFLUX DISEASE WITHOUT ESOPHAGITIS: Status: ACTIVE | Noted: 2024-04-26

## 2024-04-26 PROBLEM — R40.0 DAYTIME SOMNOLENCE: Status: ACTIVE | Noted: 2024-04-26

## 2024-04-26 PROBLEM — E66.01 MORBID OBESITY WITH BMI OF 40.0-44.9, ADULT (MULTI): Status: ACTIVE | Noted: 2024-04-26

## 2024-04-26 NOTE — RESULT ENCOUNTER NOTE
Labs okay except he has hypothyroidism, high cholesterol and diabetes and low vitamin D level, I recommend to start vitamin D supplement and levothyroxine, I expect diabetes and high cholesterol will improve with current diet, I sent prescriptions to his pharmacy

## 2024-04-26 NOTE — H&P
History Of Present Illness  Angelito Burgos, aka HANK, is a 33 y.o. male here for consultation for bariatric surgery. He suffers from morbid obesity and has been overweight for many years and has a number of weight related comorbidities. He has attempted to lose weight several times over the years. He has had successes but has regained the weight at the completion of each of his dieting attempts. He is being referred for surgical intervention for his clinically significant morbid obesity.     -Bariatric Consultation:         START WT:   286    IDEAL WT: 163     START EXCESS:     123    HT: 67.5 in  YRS OF OBESITY 6  GREATEST WT LOSS IN LBS:60  PROGRAMS FOR WT LOSS IN THE PAST : EXERCISE, LOW CALORIE, LOW FAT .     Past Medical History  Past Medical History:   Diagnosis Date    Acid reflux     Blurred vision     Dizziness     Fatigue     Foot pain     Frequent urination     Knee pain     Migraines     Morbid obesity (Multi)     Shortness of breath        Surgical History  Past Surgical History:   Procedure Laterality Date    OTHER SURGICAL HISTORY      BILATERAL ELBOWS DUE TO TWIN BREAKS        Social History  He reports that he has never smoked. He has never been exposed to tobacco smoke. He has never used smokeless tobacco. He reports that he does not currently use alcohol. He reports that he does not use drugs.    Family History  Family History   Problem Relation Name Age of Onset    Other (MORBID OBESITY) Mother      Heart attack Father      Other (HTN) Father      Kidney disease Father      Other (BERGERS DISEASE) Father      Diabetes Father      Other (HEALING PROBLEMS) Father          Allergies  Hydrocodone    Review of Systems   Allergy/Immunologic:          HIV / AIDS No.  Hepatitis A No.  Hepatitis B No.  Hepatitis C No.  Immunosuppressent drugs No.         HEENT:          Headache MIGRAINES YES. ADMITS TO BLURRED VISION, ADMITS TO FATIGUE     CARDIOLOGY:          History of Hyperlipidemia No.  Last stress test  N/A.  Last echocardiogram N/A.  Chest pain No.  High blood pressure No.  Irregular heart beat No.  Known coronary artery disease No.  Pacemaker No.  Palpitations No.         RESPIRATORY:          Hx steroid use No.  ER visits or Hospitalizations for breathing problems No.  Sleep Apnea SNORING, DAYTIME SLEEPINESS  COOK (dyspnea on exertion) YES   .  Hx of Asthma/COPD No.         GASTROENTEROLOGY:          Peptic ulcer No, Last EGD N/A, Last UGI N/A.  Colonoscopy  Last Colonoscopy N/A.  Heartburn YES          ENDOCRINOLOGY:          Diabetes No.  Thyroid disorder No.         EXTREMITIES:          Varicose Veins No.  Stasis Ulcers No.  Ankle swelling YES     Personal history DVT No.  Personal history PE No.  Personal history of other thrombolic events No.  Family history of VTE No.  Known genetic bleeding or clotting disorder No.         UROLOGY:          Kidney disease No.  Kidney stones No.  Previous UTIs No.  Urinary incontinence No.  ADMITS TO FREQUENT URINATION       MUSCULOSKELETAL:          Osteoporosis/Osteopenia No.  Arthritis No.  Joint pain YES, FOOT PAIN, KNEE PAIN         SKIN:          Hidradenitis No.  Open skin wounds No.  Rosacea No.  Healing problems No.         PSYCHOLOGY:          Anxiety none.  Depression none.  Eating disorder denies.             Physical Exam       General Examination:         GENERAL APPEARANCE: alert and oriented x 3, Pleasant and cooperative, No Acute Distress.          HEENT: PERRLA.          NECK: no lymphadenopathy, no thyromegaly, no JVD, normal flexion, normal extension.          HEART: regular rate and rhythm.          LUNGS: clear to auscultation bilaterally.          CHEST: normal shape and expansion.          ABDOMEN: obese, no hernias present, soft and not tender, no guarding, no CVA tenderness.          EXTREMITIES: pulses 2 plus bilaterally, trace bilateral edema, no ulcerations.          SKIN: normal, no rash, no tattoos.          NEUROLOGIC EXAM: CN's II-XII  "grossly intact, gait normal.          BACK: no CVA tenderness.       Last Recorded Vitals  Blood pressure (!) 160/97, pulse 83, height 1.715 m (5' 7.5\"), weight 130 kg (286 lb).       Assessment/Plan   Problem List Items Addressed This Visit             ICD-10-CM    Morbid obesity with BMI of 40.0-44.9, adult (Multi) - Primary E66.01, Z68.41    Daytime somnolence R40.0    Gastroesophageal reflux disease without esophagitis K21.9    Elevated blood pressure reading R03.0       We spent 45 minutes reviewing the three surgical options available in the treatment of morbid obesity in our practice. We reviewed the laparoscopic approach to the Migue-en-Y gastric bypass, the vertical sleeve gastrectomy, and the adjustable gastric band. We reviewed the surgical technique, the risks, and my complication rates. We also reviewed the expected weight loss, the rules necessary for becky-term success, the nutritional supplementation recommended for each operation, and the importance of incorporating excercise into the lifestyle to maintain the weight. We reviewed both the national history with each operation, my experience with each operation, the lack of long-term weight loss data with the vertical sleeve gastrectomy and the potential for exacerbating reflux with the vertical sleeve gastrectomy. In addition, we discussed the risk of adhesions, internal hernias, iron and calcium deficiency, dumping syndrome and potential for gastrojejunal ulcer with the RYGB.  TJ is going to think more about the two operations and let me know if she has additional questions or let me know which operation he would like to proceed with.  He is at risk for having undiagnosed DEISY based on symptoms and BMI so will obtain sleep study.  He has had elevated BP multiple times and likely has undiagnosed hypertension.  Will discuss with medicine.         I spent 46 minutes in the professional and overall care of this patient.      Jus Shaver MD    "

## 2024-04-27 ENCOUNTER — LAB (OUTPATIENT)
Dept: LAB | Facility: LAB | Age: 34
End: 2024-04-27
Payer: COMMERCIAL

## 2024-04-27 DIAGNOSIS — K21.9 GERD WITHOUT ESOPHAGITIS: ICD-10-CM

## 2024-04-27 DIAGNOSIS — B96.81 HELICOBACTER PYLORI GASTRITIS: Primary | ICD-10-CM

## 2024-04-27 DIAGNOSIS — K29.70 HELICOBACTER PYLORI GASTRITIS: Primary | ICD-10-CM

## 2024-04-27 LAB
COPPER SERPL-MCNC: 88.5 UG/DL (ref 70–140)
ZINC SERPL-MCNC: 81.4 UG/DL (ref 60–120)

## 2024-04-27 PROCEDURE — 87449 NOS EACH ORGANISM AG IA: CPT

## 2024-04-29 LAB
A-TOCOPHEROL VIT E SERPL-MCNC: 11.3 MG/L (ref 5.5–18)
ANNOTATION COMMENT IMP: NORMAL
BETA+GAMMA TOCOPHEROL SERPL-MCNC: 3.6 MG/L (ref 0–6)
RETINYL PALMITATE SERPL-MCNC: 0.03 MG/L (ref 0–0.1)
VIT A SERPL-MCNC: 0.62 MG/L (ref 0.3–1.2)

## 2024-04-30 LAB — H PYLORI AG STL QL IA: POSITIVE

## 2024-05-01 LAB — VIT B1 PYROPHOSHATE BLD-SCNC: 142 NMOL/L (ref 70–180)

## 2024-05-01 RX ORDER — LANSOPRAZOLE, AMOXICILLIN, CLARITHROMYCIN 30-500-500
KIT ORAL 2 TIMES DAILY
Qty: 1 EACH | Refills: 0 | Status: SHIPPED | OUTPATIENT
Start: 2024-05-01

## 2024-05-09 ENCOUNTER — CLINICAL SUPPORT (OUTPATIENT)
Dept: SLEEP MEDICINE | Facility: HOSPITAL | Age: 34
End: 2024-05-09
Payer: COMMERCIAL

## 2024-05-09 DIAGNOSIS — E53.8 B12 DEFICIENCY: ICD-10-CM

## 2024-05-09 DIAGNOSIS — E55.9 VITAMIN D DEFICIENCY: ICD-10-CM

## 2024-05-09 DIAGNOSIS — E51.9 THIAMINE DEFICIENCY: ICD-10-CM

## 2024-05-09 DIAGNOSIS — G47.33 OBSTRUCTIVE SLEEP APNEA: ICD-10-CM

## 2024-05-09 DIAGNOSIS — Z71.3 ENCOUNTER FOR NUTRITION EVALUATION PRIOR TO BARIATRIC SURGERY: ICD-10-CM

## 2024-05-09 DIAGNOSIS — E07.9 DISEASE OF THYROID GLAND: ICD-10-CM

## 2024-05-09 DIAGNOSIS — E63.9 NUTRITIONAL DISORDER: ICD-10-CM

## 2024-05-09 DIAGNOSIS — E61.0 COPPER DEFICIENCY: ICD-10-CM

## 2024-05-09 DIAGNOSIS — D50.8 IRON DEFICIENCY ANEMIA SECONDARY TO INADEQUATE DIETARY IRON INTAKE: ICD-10-CM

## 2024-05-09 DIAGNOSIS — R73.9 HYPERGLYCEMIA: ICD-10-CM

## 2024-05-09 DIAGNOSIS — Z01.818 PRE-OP EVALUATION: ICD-10-CM

## 2024-05-09 DIAGNOSIS — D68.9 COAGULATION DISORDER (MULTI): ICD-10-CM

## 2024-05-09 PROCEDURE — 95806 SLEEP STUDY UNATT&RESP EFFT: CPT | Performed by: INTERNAL MEDICINE

## 2024-05-17 DIAGNOSIS — K21.9 GASTROESOPHAGEAL REFLUX DISEASE, UNSPECIFIED WHETHER ESOPHAGITIS PRESENT: ICD-10-CM

## 2024-05-21 ENCOUNTER — NUTRITION (OUTPATIENT)
Dept: SURGERY | Facility: CLINIC | Age: 34
End: 2024-05-21
Payer: COMMERCIAL

## 2024-05-21 ENCOUNTER — OFFICE VISIT (OUTPATIENT)
Dept: SURGERY | Facility: CLINIC | Age: 34
End: 2024-05-21
Payer: COMMERCIAL

## 2024-05-21 VITALS — BODY MASS INDEX: 42.74 KG/M2 | WEIGHT: 277 LBS

## 2024-05-21 VITALS
BODY MASS INDEX: 41.98 KG/M2 | WEIGHT: 277 LBS | RESPIRATION RATE: 16 BRPM | DIASTOLIC BLOOD PRESSURE: 87 MMHG | SYSTOLIC BLOOD PRESSURE: 141 MMHG | HEIGHT: 68 IN | HEART RATE: 98 BPM

## 2024-05-21 DIAGNOSIS — E66.01 MORBID OBESITY WITH BMI OF 40.0-44.9, ADULT (MULTI): ICD-10-CM

## 2024-05-21 DIAGNOSIS — G47.33 OBSTRUCTIVE SLEEP APNEA: ICD-10-CM

## 2024-05-21 DIAGNOSIS — B96.81 HELICOBACTER PYLORI GASTRITIS: Primary | ICD-10-CM

## 2024-05-21 DIAGNOSIS — K21.9 GERD WITHOUT ESOPHAGITIS: ICD-10-CM

## 2024-05-21 DIAGNOSIS — K29.70 HELICOBACTER PYLORI GASTRITIS: Primary | ICD-10-CM

## 2024-05-21 PROCEDURE — 3008F BODY MASS INDEX DOCD: CPT | Performed by: INTERNAL MEDICINE

## 2024-05-21 PROCEDURE — 99213 OFFICE O/P EST LOW 20 MIN: CPT | Performed by: INTERNAL MEDICINE

## 2024-05-21 ASSESSMENT — PAIN SCALES - GENERAL: PAINLEVEL: 0-NO PAIN

## 2024-05-21 ASSESSMENT — ENCOUNTER SYMPTOMS
LOSS OF SENSATION IN FEET: 0
CONSTIPATION: 0
DIFFICULTY URINATING: 0
DEPRESSION: 0
ABDOMINAL PAIN: 0
DIZZINESS: 0
OCCASIONAL FEELINGS OF UNSTEADINESS: 0
WEAKNESS: 0
COUGH: 0
NAUSEA: 0
HEADACHES: 0
ROS GI COMMENTS: ACID REFLUX
SHORTNESS OF BREATH: 0
FEVER: 0
BACK PAIN: 1
ARTHRALGIAS: 1
DIARRHEA: 0
FATIGUE: 0
CHILLS: 0

## 2024-05-21 ASSESSMENT — PATIENT HEALTH QUESTIONNAIRE - PHQ9
1. LITTLE INTEREST OR PLEASURE IN DOING THINGS: NOT AT ALL
2. FEELING DOWN, DEPRESSED OR HOPELESS: NOT AT ALL
SUM OF ALL RESPONSES TO PHQ9 QUESTIONS 1 AND 2: 0

## 2024-05-21 NOTE — PROGRESS NOTES
Medical Weight Loss Appointment (MWL 2)    Current Weight: 277 lbs / 9 lb weight loss from last appointment   Current BMI: 42.74     Current Diet: practicing the lifelong rules   Adherence: good   Daily Intake: 3 meals per day   Breakfast-  Roscoe celeste (2) with 2 eggs   Lunch- Libertad Khan - orders an Entree Salad OR [employer] OR healthy choice meals from nutrition guide provided. Having a lot of salads with skinny girl  dressings.    Dinner- Chicken or turkey wrap. Salads.   Snacks:  has cut back on snacking   Beverages: water, gatorade zero, crystal light packets   Exercise: Walking his dog outside everyday   Behavior/Diet Changes: Tennis reports that he has made a lot of dietary changes this past month, which have reflected a 9 lb weight loss.   - Eating more grilled foods, Only had fried food once this past month.   - Stopped drinking all sugary beverages   - Preparing more meals at home. Referring to nutrition guide for meal ideas.    - Cut back on carbohydrate portion. Sticking to 20-40 grams of carbohydrates per meal.   Angelito states that he is feeling significantly better. He has more energy.     Estimated ability to achieve goals: good     Today's Lesson: Reviewed patient's dietary changes and food recall  Provided easy meal idea handouts for additional resources   Diet Goals: Practice the lifelong rules  Exercise Recommendations: Gradually work up to 150 minutes of moderate intensity exercise per week.  Goals for the month:   - continue to implement the lifelong rules   - 1-2# weight loss per week desirable     Plan: Will follow up next month. Will continue to monitor pt's weight, dietary & behavior changes.      Laura Fernandez, MS, RD, LD

## 2024-05-21 NOTE — PROGRESS NOTES
"Subjective   Patient ID: Angelito Burgos is a 34 y.o. male who presents for [facility] visit 2. Currently has 3 meals a day, 20-40g of protein with each meal. Snacks on popcorn. Drinks mostly water, gatorade zero. Regarding exercise, he has been walking 5x a week. Denies personal hx of blood clots. C/o low back pain, knee pain, and acid reflux. Started vit D supplements.    Diagnostics Reviewed: 4/2024 EKG NSR.  Labs Reviewed: 4/2024 labs WNL except hpylori positive, vit D 8, cholesterol 245, A1c 6.5, TSH 9.8.         Review of Systems   Constitutional:  Negative for chills, fatigue and fever.        Excessive daytime somnolence   HENT:  Negative for dental problem.    Respiratory:  Negative for cough and shortness of breath.    Gastrointestinal:  Negative for abdominal pain, constipation, diarrhea and nausea.        Acid reflux   Genitourinary:  Negative for difficulty urinating.   Musculoskeletal:  Positive for arthralgias and back pain.   Neurological:  Negative for dizziness, weakness and headaches.       Objective   /87   Pulse 98   Resp 16   Ht 1.715 m (5' 7.5\")   Wt 126 kg (277 lb)   BMI 42.74 kg/m²     Physical Exam  Constitutional:       General: He is not in acute distress.     Appearance: He is obese.   HENT:      Mouth/Throat:      Comments: Dentition WNL  Cardiovascular:      Rate and Rhythm: Normal rate and regular rhythm.      Heart sounds: Normal heart sounds.   Pulmonary:      Breath sounds: Normal breath sounds.   Abdominal:      Palpations: Abdomen is soft.      Tenderness: There is no abdominal tenderness.   Musculoskeletal:      Cervical back: No tenderness.      Right lower leg: No edema.      Left lower leg: No edema.   Lymphadenopathy:      Cervical: No cervical adenopathy.   Skin:     General: Skin is warm.      Findings: No erythema.   Neurological:      Mental Status: He is alert and oriented to person, place, and time.      Gait: Gait is intact. Gait normal.   Psychiatric:         Mood " and Affect: Mood normal.         Behavior: Behavior normal.         Assessment/Plan   Diagnoses and all orders for this visit:  Helicobacter pylori gastritis  -     H. Pylori Antigen, Stool; Future        Scribe Attestation  By signing my name below, I, Stacy Garza, Scrrajiv   attest that this documentation has been prepared under the direction and in the presence of Yoly Hyde MD.

## 2024-05-31 RX ORDER — ONDANSETRON HYDROCHLORIDE 2 MG/ML
4 INJECTION, SOLUTION INTRAVENOUS ONCE AS NEEDED
Status: CANCELLED | OUTPATIENT
Start: 2024-05-31

## 2024-05-31 RX ORDER — FLUMAZENIL 0.1 MG/ML
0.2 INJECTION INTRAVENOUS ONCE AS NEEDED
Status: CANCELLED | OUTPATIENT
Start: 2024-05-31

## 2024-05-31 RX ORDER — NALOXONE HYDROCHLORIDE 0.4 MG/ML
0.2 INJECTION, SOLUTION INTRAMUSCULAR; INTRAVENOUS; SUBCUTANEOUS EVERY 5 MIN PRN
Status: CANCELLED | OUTPATIENT
Start: 2024-05-31

## 2024-06-03 ENCOUNTER — APPOINTMENT (OUTPATIENT)
Dept: PREADMISSION TESTING | Facility: HOSPITAL | Age: 34
End: 2024-06-03
Payer: COMMERCIAL

## 2024-06-06 ENCOUNTER — ANESTHESIA (OUTPATIENT)
Dept: GASTROENTEROLOGY | Facility: HOSPITAL | Age: 34
End: 2024-06-06
Payer: COMMERCIAL

## 2024-06-06 ENCOUNTER — HOSPITAL ENCOUNTER (OUTPATIENT)
Dept: GASTROENTEROLOGY | Facility: HOSPITAL | Age: 34
Discharge: HOME | End: 2024-06-06
Payer: COMMERCIAL

## 2024-06-06 ENCOUNTER — NUTRITION (OUTPATIENT)
Dept: SURGERY | Facility: CLINIC | Age: 34
End: 2024-06-06
Payer: COMMERCIAL

## 2024-06-06 ENCOUNTER — LAB (OUTPATIENT)
Dept: LAB | Facility: LAB | Age: 34
End: 2024-06-06
Payer: COMMERCIAL

## 2024-06-06 ENCOUNTER — ANESTHESIA EVENT (OUTPATIENT)
Dept: GASTROENTEROLOGY | Facility: HOSPITAL | Age: 34
End: 2024-06-06
Payer: COMMERCIAL

## 2024-06-06 VITALS
TEMPERATURE: 97.3 F | BODY MASS INDEX: 43.25 KG/M2 | WEIGHT: 275.57 LBS | RESPIRATION RATE: 18 BRPM | HEIGHT: 67 IN | OXYGEN SATURATION: 95 % | SYSTOLIC BLOOD PRESSURE: 130 MMHG | HEART RATE: 76 BPM | DIASTOLIC BLOOD PRESSURE: 78 MMHG

## 2024-06-06 VITALS — BODY MASS INDEX: 43.28 KG/M2 | WEIGHT: 279 LBS

## 2024-06-06 DIAGNOSIS — B96.81 HELICOBACTER PYLORI GASTRITIS: ICD-10-CM

## 2024-06-06 DIAGNOSIS — K21.9 GASTRO-ESOPHAGEAL REFLUX DISEASE WITHOUT ESOPHAGITIS: Primary | ICD-10-CM

## 2024-06-06 DIAGNOSIS — K21.9 GASTROESOPHAGEAL REFLUX DISEASE WITHOUT ESOPHAGITIS: ICD-10-CM

## 2024-06-06 DIAGNOSIS — K29.70 HELICOBACTER PYLORI GASTRITIS: ICD-10-CM

## 2024-06-06 LAB — GLUCOSE BLD MANUAL STRIP-MCNC: 111 MG/DL (ref 74–99)

## 2024-06-06 PROCEDURE — 3700000002 HC GENERAL ANESTHESIA TIME - EACH INCREMENTAL 1 MINUTE

## 2024-06-06 PROCEDURE — 87449 NOS EACH ORGANISM AG IA: CPT

## 2024-06-06 PROCEDURE — 2500000004 HC RX 250 GENERAL PHARMACY W/ HCPCS (ALT 636 FOR OP/ED)

## 2024-06-06 PROCEDURE — 88305 TISSUE EXAM BY PATHOLOGIST: CPT | Mod: TC | Performed by: SURGERY

## 2024-06-06 PROCEDURE — 7100000002 HC RECOVERY ROOM TIME - EACH INCREMENTAL 1 MINUTE

## 2024-06-06 PROCEDURE — 7100000001 HC RECOVERY ROOM TIME - INITIAL BASE CHARGE

## 2024-06-06 PROCEDURE — 3700000001 HC GENERAL ANESTHESIA TIME - INITIAL BASE CHARGE

## 2024-06-06 PROCEDURE — 7100000010 HC PHASE TWO TIME - EACH INCREMENTAL 1 MINUTE

## 2024-06-06 PROCEDURE — 2500000004 HC RX 250 GENERAL PHARMACY W/ HCPCS (ALT 636 FOR OP/ED): Performed by: ANESTHESIOLOGY

## 2024-06-06 PROCEDURE — 82947 ASSAY GLUCOSE BLOOD QUANT: CPT

## 2024-06-06 PROCEDURE — 7100000009 HC PHASE TWO TIME - INITIAL BASE CHARGE

## 2024-06-06 PROCEDURE — 43239 EGD BIOPSY SINGLE/MULTIPLE: CPT | Performed by: SURGERY

## 2024-06-06 RX ORDER — ALBUTEROL SULFATE 0.83 MG/ML
2.5 SOLUTION RESPIRATORY (INHALATION) ONCE
Status: DISCONTINUED | OUTPATIENT
Start: 2024-06-06 | End: 2024-06-07 | Stop reason: HOSPADM

## 2024-06-06 RX ORDER — LIDOCAINE HYDROCHLORIDE 10 MG/ML
0.1 INJECTION INFILTRATION; PERINEURAL ONCE
Status: DISCONTINUED | OUTPATIENT
Start: 2024-06-06 | End: 2024-06-07 | Stop reason: HOSPADM

## 2024-06-06 RX ORDER — FENTANYL CITRATE 50 UG/ML
50 INJECTION, SOLUTION INTRAMUSCULAR; INTRAVENOUS EVERY 5 MIN PRN
Status: DISCONTINUED | OUTPATIENT
Start: 2024-06-06 | End: 2024-06-07 | Stop reason: HOSPADM

## 2024-06-06 RX ORDER — ONDANSETRON HYDROCHLORIDE 2 MG/ML
4 INJECTION, SOLUTION INTRAVENOUS ONCE AS NEEDED
Status: DISCONTINUED | OUTPATIENT
Start: 2024-06-06 | End: 2024-06-07 | Stop reason: HOSPADM

## 2024-06-06 RX ORDER — SODIUM CHLORIDE, SODIUM LACTATE, POTASSIUM CHLORIDE, CALCIUM CHLORIDE 600; 310; 30; 20 MG/100ML; MG/100ML; MG/100ML; MG/100ML
100 INJECTION, SOLUTION INTRAVENOUS CONTINUOUS
Status: DISCONTINUED | OUTPATIENT
Start: 2024-06-06 | End: 2024-06-07 | Stop reason: HOSPADM

## 2024-06-06 RX ORDER — MIDAZOLAM HYDROCHLORIDE 1 MG/ML
INJECTION, SOLUTION INTRAMUSCULAR; INTRAVENOUS AS NEEDED
Status: DISCONTINUED | OUTPATIENT
Start: 2024-06-06 | End: 2024-06-06

## 2024-06-06 RX ORDER — OMEPRAZOLE 40 MG/1
40 CAPSULE, DELAYED RELEASE ORAL
Qty: 30 CAPSULE | Refills: 2 | Status: SHIPPED | OUTPATIENT
Start: 2024-06-06 | End: 2024-09-04

## 2024-06-06 RX ORDER — PROPOFOL 10 MG/ML
INJECTION, EMULSION INTRAVENOUS AS NEEDED
Status: DISCONTINUED | OUTPATIENT
Start: 2024-06-06 | End: 2024-06-06

## 2024-06-06 RX ORDER — MIDAZOLAM HYDROCHLORIDE 1 MG/ML
1 INJECTION, SOLUTION INTRAMUSCULAR; INTRAVENOUS ONCE AS NEEDED
Status: DISCONTINUED | OUTPATIENT
Start: 2024-06-06 | End: 2024-06-07 | Stop reason: HOSPADM

## 2024-06-06 RX ORDER — SODIUM CHLORIDE, SODIUM LACTATE, POTASSIUM CHLORIDE, CALCIUM CHLORIDE 600; 310; 30; 20 MG/100ML; MG/100ML; MG/100ML; MG/100ML
20 INJECTION, SOLUTION INTRAVENOUS CONTINUOUS
Status: DISCONTINUED | OUTPATIENT
Start: 2024-06-06 | End: 2024-06-07 | Stop reason: HOSPADM

## 2024-06-06 RX ADMIN — PROPOFOL 100 MG: 10 INJECTION, EMULSION INTRAVENOUS at 07:48

## 2024-06-06 RX ADMIN — PROPOFOL 100 MG: 10 INJECTION, EMULSION INTRAVENOUS at 07:47

## 2024-06-06 RX ADMIN — SODIUM CHLORIDE, SODIUM LACTATE, POTASSIUM CHLORIDE, AND CALCIUM CHLORIDE 20 ML/HR: 600; 310; 30; 20 INJECTION, SOLUTION INTRAVENOUS at 07:05

## 2024-06-06 RX ADMIN — MIDAZOLAM 2 MG: 1 INJECTION INTRAMUSCULAR; INTRAVENOUS at 07:38

## 2024-06-06 RX ADMIN — PROPOFOL 100 MG: 10 INJECTION, EMULSION INTRAVENOUS at 07:54

## 2024-06-06 RX ADMIN — PROPOFOL 100 MG: 10 INJECTION, EMULSION INTRAVENOUS at 07:51

## 2024-06-06 ASSESSMENT — PAIN SCALES - GENERAL
PAINLEVEL_OUTOF10: 2
PAINLEVEL_OUTOF10: 0 - NO PAIN
PAINLEVEL_OUTOF10: 0 - NO PAIN
PAINLEVEL_OUTOF10: 1
PAINLEVEL_OUTOF10: 0 - NO PAIN
PAINLEVEL_OUTOF10: 2
PAINLEVEL_OUTOF10: 0 - NO PAIN

## 2024-06-06 ASSESSMENT — PAIN - FUNCTIONAL ASSESSMENT
PAIN_FUNCTIONAL_ASSESSMENT: 0-10

## 2024-06-06 ASSESSMENT — PAIN DESCRIPTION - DESCRIPTORS
DESCRIPTORS: BURNING

## 2024-06-06 NOTE — ANESTHESIA POSTPROCEDURE EVALUATION
"Patient: Angelito Burgos \"TJ\"    Procedure Summary       Date: 06/06/24 Room / Location: [facility]    Anesthesia Start: 0735 Anesthesia Stop: 0813    Procedure: EGD Diagnosis:       Gastro-esophageal reflux disease without esophagitis      Gastro-esophageal reflux disease without esophagitis    Scheduled Providers: Jus Shaver MD; Ty Rizvi DO Responsible Provider: Ty Rizvi DO    Anesthesia Type: MAC ASA Status: 3            Anesthesia Type: MAC    Vitals Value Taken Time   BP  06/06/24 0813   Temp  06/06/24 0813   Pulse  06/06/24 0813   Resp  06/06/24 0813   SpO2  06/06/24 0813       Anesthesia Post Evaluation    Patient location during evaluation: bedside  Patient participation: complete - patient participated  Level of consciousness: awake  Pain management: adequate  Airway patency: patent  Cardiovascular status: acceptable  Respiratory status: acceptable  Hydration status: acceptable  Postoperative Nausea and Vomiting: none        There were no known notable events for this encounter.    "

## 2024-06-06 NOTE — PROGRESS NOTES
Weight Check     Current Weight: 279 lb   Current BMI: 43.28    Tennis (HANK) is present in the office for a weight check. He will meet with OPAL Sanchez, LD for MWL 3 appt on 6/18 virtually. We reviewed carbohydrate intake and portion sizes, as well as suggestions for lower carb, higher fiber sources. I provided HANK recipes to try at home.       Patricia Anderosn RD, LDN  Registered Dietitian, Licensed Dietitian Nutritionist

## 2024-06-06 NOTE — POST-PROCEDURE NOTE
0850:  Patient returned to Phase II alert and oriented.  IV intact and infusing with LR at KVO.  He denies pain at this time, given beverage.    0925:  Home going instructions reviewed with patient and family, no questions or concerns

## 2024-06-06 NOTE — SIGNIFICANT EVENT
Bedside report to Laura TATE to assume care of the pt in Phase 2. All questions answered. POC to be maintained.

## 2024-06-06 NOTE — ANESTHESIA PREPROCEDURE EVALUATION
"Patient: Angelito Burgos \"TJ\"    Procedure Information       Date/Time: 06/06/24 0730    Scheduled providers: Jus Shaver MD; Ty Rizvi DO    Procedure: EGD    Location: [facility]            Relevant Problems   Anesthesia (within normal limits)      GI   (+) Gastroesophageal reflux disease without esophagitis       Clinical information reviewed:   Tobacco  Allergies  Meds   Med Hx  Surg Hx   Fam Hx  Soc Hx        NPO Detail:  NPO/Void Status  Carbohydrate Drink Given Prior to Surgery? : N  Date of Last Liquid: 06/05/24  Time of Last Liquid: 2030  Date of Last Solid: 06/05/24  Time of Last Solid: 2030  Time of Last Void: 0600         Physical Exam    Airway  Mallampati: III  TM distance: >3 FB     Cardiovascular    Dental    Pulmonary    Abdominal            Anesthesia Plan    History of general anesthesia?: yes  History of complications of general anesthesia?: no    ASA 3     MAC     intravenous induction   Anesthetic plan and risks discussed with patient.      "

## 2024-06-07 LAB
LABORATORY COMMENT REPORT: NORMAL
PATH REPORT.FINAL DX SPEC: NORMAL
PATH REPORT.GROSS SPEC: NORMAL
PATH REPORT.TOTAL CANCER: NORMAL

## 2024-06-08 LAB — H PYLORI AG STL QL IA: NEGATIVE

## 2024-06-12 ENCOUNTER — TELEPHONE (OUTPATIENT)
Dept: SURGERY | Facility: CLINIC | Age: 34
End: 2024-06-12
Payer: COMMERCIAL

## 2024-06-12 NOTE — TELEPHONE ENCOUNTER
r/s appt as necessary per Dr. Hyde schedule changing for the day. Patient met with dietitian on June 6th so the afternoon appt on 6/18 with the dietitian was cancelled. Patient needs to do televisit at 1245 due to this is his lunch break and he can not step away from work

## 2024-06-18 ENCOUNTER — APPOINTMENT (OUTPATIENT)
Dept: SURGERY | Facility: CLINIC | Age: 34
End: 2024-06-18
Payer: COMMERCIAL

## 2024-06-18 VITALS — WEIGHT: 279 LBS | BODY MASS INDEX: 43.79 KG/M2 | HEIGHT: 67 IN

## 2024-06-18 DIAGNOSIS — E66.01 MORBID OBESITY WITH BMI OF 40.0-44.9, ADULT (MULTI): ICD-10-CM

## 2024-06-18 DIAGNOSIS — K21.9 GERD WITHOUT ESOPHAGITIS: ICD-10-CM

## 2024-06-18 DIAGNOSIS — G47.33 OBSTRUCTIVE SLEEP APNEA: Primary | ICD-10-CM

## 2024-06-18 PROCEDURE — 3008F BODY MASS INDEX DOCD: CPT | Performed by: INTERNAL MEDICINE

## 2024-06-18 PROCEDURE — 99213 OFFICE O/P EST LOW 20 MIN: CPT | Performed by: INTERNAL MEDICINE

## 2024-06-18 RX ORDER — ASPIRIN 325 MG
1 TABLET, DELAYED RELEASE (ENTERIC COATED) ORAL
COMMUNITY
Start: 2024-04-26 | End: 2024-06-18 | Stop reason: ALTCHOICE

## 2024-06-18 ASSESSMENT — ENCOUNTER SYMPTOMS
COUGH: 0
LOSS OF SENSATION IN FEET: 0
DIZZINESS: 0
ROS GI COMMENTS: ACID REFLUX
ARTHRALGIAS: 1
HEADACHES: 0
BACK PAIN: 1
DIARRHEA: 0
NAUSEA: 0
DEPRESSION: 0
FATIGUE: 0
OCCASIONAL FEELINGS OF UNSTEADINESS: 0
CHILLS: 0
DIFFICULTY URINATING: 0
FEVER: 0
ABDOMINAL PAIN: 0
WEAKNESS: 0
CONSTIPATION: 0
SHORTNESS OF BREATH: 0

## 2024-06-18 ASSESSMENT — PAIN SCALES - GENERAL: PAINLEVEL: 0-NO PAIN

## 2024-06-18 NOTE — PROGRESS NOTES
"Subjective   Patient ID: HANK Burgos is a 34 y.o. male who presents for [facility] visit 3. Currently has 3 meals a day, 20-40g of protein with each meal. Has regulated his carbohydrate intake. Has also been working on his snacking between meals and no longer eats popcorn. Drinks mostly water, gatorade zero. Reports exercising more, walking every day. Denies personal hx of blood clots. C/o low back pain, knee pain, and acid reflux. Started vit D supplements. Uses CPAP nightly and tolerates it well.     Diagnostics Reviewed: 4/2024 EKG NSR.  Labs Reviewed: 4/2024 labs WNL except hpylori positive, vit D 8, cholesterol 245, A1c 6.5, TSH 9.8. 6/6/2024 repeat H.pylori negative.         Review of Systems   Constitutional:  Negative for chills, fatigue and fever.        Excessive daytime somnolence   HENT:  Negative for dental problem.    Respiratory:  Negative for cough and shortness of breath.    Gastrointestinal:  Negative for abdominal pain, constipation, diarrhea and nausea.        Acid reflux   Genitourinary:  Negative for difficulty urinating.   Musculoskeletal:  Positive for arthralgias and back pain.   Neurological:  Negative for dizziness, weakness and headaches.       Objective   Ht 1.708 m (5' 7.25\")   Wt 127 kg (279 lb)   BMI 43.37 kg/m²     Physical Exam  Neurological:      Mental Status: He is alert and oriented to person, place, and time.   Psychiatric:         Mood and Affect: Mood normal.         Behavior: Behavior normal.         Assessment/Plan   There are no diagnoses linked to this encounter.        Scribe Attestation  By signing my name below, I, Erlinda Peck   attest that this documentation has been prepared under the direction and in the presence of Yoly Hyde MD.    "

## 2024-07-08 ENCOUNTER — TELEPHONE (OUTPATIENT)
Dept: SURGERY | Facility: CLINIC | Age: 34
End: 2024-07-08
Payer: COMMERCIAL

## 2024-07-08 NOTE — TELEPHONE ENCOUNTER
Medication question he is taking triameterene hydrochlorathiazide he is having swelling in legs, he does not think it is helping/ he thinks he may need a larger dose. Can you please reach out to him and let him know/ his preferred pharmacy is listed.  The patient phone number was verified as well.

## 2024-07-19 ENCOUNTER — NUTRITION (OUTPATIENT)
Dept: SURGERY | Facility: CLINIC | Age: 34
End: 2024-07-19
Payer: COMMERCIAL

## 2024-07-19 VITALS — WEIGHT: 275 LBS | BODY MASS INDEX: 42.75 KG/M2

## 2024-07-19 NOTE — PROGRESS NOTES
Medical Weight Loss Appointment (MWL)    Starting Weight: 286 lbs   Current Weight: 275 lbs / This reflects a 4 lb wt loss x 1 month.   Current BMI: 42.75    Current Diet: practicing the lifelong rules   Adherence: good.   Daily Intake: 3 meals/day; 40 grams of protein per meal.   Breakfast- 2 eggs and 2 sausage sausage patties  Lunch- a salad with grilled chicken/shrimp, OR a sandwich with a higher fiber flatbread, OR grilled chicken and vegetables   Dinner- same options as lunch  Snacks: none.   Beverages: mostly water. All other beverages are sugar-free   Exercise: walking 1 mile, 4-5x/week  Behavior/Diet Changes: Tj is continuing to follow the lifelong rules. He reports eliminating snacking between meals and focusing on high protein intake at meals.     Estimated ability to achieve goals: good.     Today's Lesson: Reviewed patient's dietary changes and food recall.   Diet Goals: Practice the lifelong rules  Exercise Recommendations: Gradually work up to 150 minutes of moderate intensity exercise per week.  Behavior Goals:  1. Continue to follow lifelong rules.     Plan: Provided dietary clearance at today's appointment.        Patricia Anderson RD, LDN  Registered Dietitian, Licensed Dietitian Nutritionist

## 2024-07-30 DIAGNOSIS — E66.01 MORBID OBESITY (MULTI): ICD-10-CM

## 2024-08-13 ENCOUNTER — TELEPHONE (OUTPATIENT)
Dept: SURGERY | Facility: CLINIC | Age: 34
End: 2024-08-13
Payer: COMMERCIAL

## 2024-08-20 ENCOUNTER — TELEMEDICINE (OUTPATIENT)
Dept: SURGERY | Facility: CLINIC | Age: 34
End: 2024-08-20
Payer: COMMERCIAL

## 2024-08-20 VITALS — BODY MASS INDEX: 43.16 KG/M2 | HEIGHT: 67 IN | WEIGHT: 275 LBS

## 2024-08-20 DIAGNOSIS — E66.01 MORBID OBESITY WITH BMI OF 40.0-44.9, ADULT (MULTI): ICD-10-CM

## 2024-08-20 DIAGNOSIS — G47.33 OBSTRUCTIVE SLEEP APNEA: Primary | ICD-10-CM

## 2024-08-20 DIAGNOSIS — K21.9 GERD WITHOUT ESOPHAGITIS: ICD-10-CM

## 2024-08-20 PROCEDURE — 99213 OFFICE O/P EST LOW 20 MIN: CPT | Performed by: INTERNAL MEDICINE

## 2024-08-20 PROCEDURE — 3008F BODY MASS INDEX DOCD: CPT | Performed by: INTERNAL MEDICINE

## 2024-08-20 ASSESSMENT — ENCOUNTER SYMPTOMS
DEPRESSION: 0
BACK PAIN: 1
OCCASIONAL FEELINGS OF UNSTEADINESS: 0
SHORTNESS OF BREATH: 0
DIFFICULTY URINATING: 0
FEVER: 0
ROS GI COMMENTS: ACID REFLUX
HEADACHES: 0
FATIGUE: 0
COUGH: 0
ABDOMINAL PAIN: 0
NAUSEA: 0
LOSS OF SENSATION IN FEET: 0
DIZZINESS: 0
CONSTIPATION: 0
ARTHRALGIAS: 1
WEAKNESS: 0
CHILLS: 0
DIARRHEA: 0

## 2024-08-20 ASSESSMENT — PATIENT HEALTH QUESTIONNAIRE - PHQ9
SUM OF ALL RESPONSES TO PHQ9 QUESTIONS 1 AND 2: 0
1. LITTLE INTEREST OR PLEASURE IN DOING THINGS: NOT AT ALL
2. FEELING DOWN, DEPRESSED OR HOPELESS: NOT AT ALL

## 2024-08-20 ASSESSMENT — PAIN SCALES - GENERAL: PAINLEVEL: 0-NO PAIN

## 2024-08-20 NOTE — PROGRESS NOTES
"Subjective   Patient ID: HANK Burgos is a 34 y.o. male who presents for [facility] visit 4. Currently has 3 meals a day, has up to 40g of protein with each meal. Has limited his carbohydrate intake. Has also been working on his snacking between meals and tries not to bring food to work. If he snacks, it's chips (follows serving size) once or twice a week. Drinks mostly water, gatorade zero and an occasional diet soda. For exercise he walks a mile every day which takes about 30-40 minutes. Denies personal hx of blood clots. C/o low back pain, knee pain, and acid reflux. Uses CPAP nightly and tolerates it well.     Diagnostics Reviewed:   Labs Reviewed:          Review of Systems   Constitutional:  Negative for chills, fatigue and fever.   HENT:  Negative for dental problem.    Respiratory:  Negative for cough and shortness of breath.    Gastrointestinal:  Negative for abdominal pain, constipation, diarrhea and nausea.        Acid reflux   Genitourinary:  Negative for difficulty urinating.   Musculoskeletal:  Positive for arthralgias and back pain.   Neurological:  Negative for dizziness, weakness and headaches.       Objective   Ht 1.708 m (5' 7.25\")   Wt 125 kg (275 lb)   BMI 42.75 kg/m²     Physical Exam  Cardiovascular:      Rate and Rhythm: Normal rate and regular rhythm.      Heart sounds: Normal heart sounds.   Pulmonary:      Breath sounds: Normal breath sounds.   Abdominal:      Palpations: Abdomen is soft. There is no hepatomegaly.      Tenderness: There is no abdominal tenderness.   Musculoskeletal:      Right lower leg: No edema.      Left lower leg: No edema.   Neurological:      Mental Status: He is alert and oriented to person, place, and time.      Gait: Gait normal.   Psychiatric:         Mood and Affect: Mood normal.         Behavior: Behavior normal.         Assessment/Plan   Diagnoses and all orders for this visit:  Obstructive sleep apnea  GERD without esophagitis  Morbid obesity with BMI of 40.0-44.9, " adult (Multi)          Scribe Attestation  By signing my name below, I, Erlinda Peck   attest that this documentation has been prepared under the direction and in the presence of Yoly Hyde MD.

## 2024-08-25 DIAGNOSIS — K21.9 GASTROESOPHAGEAL REFLUX DISEASE WITHOUT ESOPHAGITIS: ICD-10-CM

## 2024-08-25 DIAGNOSIS — K21.9 GASTRO-ESOPHAGEAL REFLUX DISEASE WITHOUT ESOPHAGITIS: ICD-10-CM

## 2024-08-26 RX ORDER — OMEPRAZOLE 40 MG/1
40 CAPSULE, DELAYED RELEASE ORAL DAILY
Qty: 90 CAPSULE | Refills: 1 | Status: SHIPPED | OUTPATIENT
Start: 2024-08-26 | End: 2025-02-22

## 2024-09-04 ENCOUNTER — APPOINTMENT (OUTPATIENT)
Dept: SURGERY | Facility: CLINIC | Age: 34
End: 2024-09-04
Payer: COMMERCIAL

## 2024-09-11 ENCOUNTER — APPOINTMENT (OUTPATIENT)
Dept: SURGERY | Facility: CLINIC | Age: 34
End: 2024-09-11
Payer: COMMERCIAL

## 2024-09-11 VITALS
DIASTOLIC BLOOD PRESSURE: 77 MMHG | RESPIRATION RATE: 16 BRPM | BODY MASS INDEX: 41.68 KG/M2 | HEIGHT: 68 IN | WEIGHT: 275 LBS | HEART RATE: 74 BPM | SYSTOLIC BLOOD PRESSURE: 131 MMHG

## 2024-09-11 DIAGNOSIS — K21.9 GERD WITHOUT ESOPHAGITIS: ICD-10-CM

## 2024-09-11 DIAGNOSIS — Z71.89 ENCOUNTER FOR PRE-BARIATRIC SURGERY COUNSELING AND EDUCATION: Primary | ICD-10-CM

## 2024-09-11 DIAGNOSIS — E66.01 MORBID OBESITY WITH BMI OF 40.0-44.9, ADULT (MULTI): ICD-10-CM

## 2024-09-11 DIAGNOSIS — G47.33 OBSTRUCTIVE SLEEP APNEA: ICD-10-CM

## 2024-09-11 DIAGNOSIS — I10 PRIMARY HYPERTENSION: Primary | ICD-10-CM

## 2024-09-11 DIAGNOSIS — Z01.818 PRE-OP EVALUATION: ICD-10-CM

## 2024-09-11 PROCEDURE — 3075F SYST BP GE 130 - 139MM HG: CPT | Performed by: INTERNAL MEDICINE

## 2024-09-11 PROCEDURE — 3008F BODY MASS INDEX DOCD: CPT | Performed by: INTERNAL MEDICINE

## 2024-09-11 PROCEDURE — 1036F TOBACCO NON-USER: CPT

## 2024-09-11 PROCEDURE — 3078F DIAST BP <80 MM HG: CPT | Performed by: INTERNAL MEDICINE

## 2024-09-11 PROCEDURE — 99214 OFFICE O/P EST MOD 30 MIN: CPT | Performed by: INTERNAL MEDICINE

## 2024-09-11 PROCEDURE — 99024 POSTOP FOLLOW-UP VISIT: CPT

## 2024-09-11 RX ORDER — PANTOPRAZOLE SODIUM 40 MG/1
40 TABLET, DELAYED RELEASE ORAL
COMMUNITY
Start: 2024-08-26 | End: 2024-09-11 | Stop reason: WASHOUT

## 2024-09-11 ASSESSMENT — PAIN SCALES - GENERAL: PAINLEVEL: 0-NO PAIN

## 2024-09-11 ASSESSMENT — ENCOUNTER SYMPTOMS
ABDOMINAL PAIN: 0
CONSTIPATION: 0
DIZZINESS: 0
DIARRHEA: 0
OCCASIONAL FEELINGS OF UNSTEADINESS: 0
ARTHRALGIAS: 0
DEPRESSION: 0
COUGH: 0
NAUSEA: 0
SHORTNESS OF BREATH: 0
PALPITATIONS: 0
LOSS OF SENSATION IN FEET: 0

## 2024-09-11 ASSESSMENT — PATIENT HEALTH QUESTIONNAIRE - PHQ9
2. FEELING DOWN, DEPRESSED OR HOPELESS: NOT AT ALL
SUM OF ALL RESPONSES TO PHQ9 QUESTIONS 1 AND 2: 0
1. LITTLE INTEREST OR PLEASURE IN DOING THINGS: NOT AT ALL

## 2024-09-11 NOTE — PROGRESS NOTES
"Subjective   Patient ID: HANK Burgos is a 34 y.o. male who presents for Pre-op Exam (Pre op clearance).    Had a recent ER visit on 8/26/2024 for abdominal pain and odd colored bowel movements. Currently has 3 meals a day, has up to 40g of protein with each meal. Has limited his carbohydrate intake. Has also been working on his snacking between meals and tries not to bring food to work. If he snacks, it's chips (follows serving size) once or twice a week. Drinks mostly water, gatorade zero. For exercise he walks a mile every day with his dog which takes about 1 hour. Denies personal hx of blood clots. C/o low back pain, knee pain, and acid reflux. Uses CPAP nightly and tolerates it well.     Diagnostics Reviewed:  Labs Reviewed:           Review of Systems   Respiratory:  Negative for cough and shortness of breath.    Cardiovascular:  Negative for chest pain and palpitations.   Gastrointestinal:  Negative for abdominal pain, constipation, diarrhea and nausea.   Musculoskeletal:  Negative for arthralgias.   Neurological:  Negative for dizziness.       Objective   /77   Pulse 74   Resp 16   Ht 1.715 m (5' 7.5\")   Wt 125 kg (275 lb)   BMI 42.44 kg/m²     Physical Exam  Cardiovascular:      Rate and Rhythm: Normal rate and regular rhythm.      Heart sounds: Normal heart sounds.   Pulmonary:      Breath sounds: Normal breath sounds.   Abdominal:      Palpations: Abdomen is soft. There is no hepatomegaly.      Tenderness: There is no abdominal tenderness.   Musculoskeletal:      Right lower leg: No edema.      Left lower leg: No edema.   Neurological:      Mental Status: He is alert and oriented to person, place, and time.      Gait: Gait normal.   Psychiatric:         Mood and Affect: Mood normal.         Behavior: Behavior normal.         Assessment/Plan   Diagnoses and all orders for this visit:  Primary hypertension  Obstructive sleep apnea  GERD without esophagitis  Morbid obesity with BMI of 40.0-44.9, " adult (Multi)  Pre-op evaluation    He is medically clear for surgery  Scribe Attestation  By signing my name below, I, Erlinda Lui   attest that this documentation has been prepared under the direction and in the presence of Yoly Hyde MD.

## 2024-09-11 NOTE — PROGRESS NOTES
Pre-Operative Dietary Education     Current Weight: 275 lbs   Current BMI: 42.44     In class settings, reviewed thin liquids diet that patient will be required to follow for 2 weeks after surgery. Reviewed low calorie fluids along with protein shakes and products that can be consumed. Emphasized the importance of following diet guidelines and recommendations after surgery to prevent complications. Addressed liquids and items to avoid at this time. Patients are educated to drink at least 50 oz of fluid per day, and gradually take in 50g protein per day. Briefly reviewed the diet progression for the next 3-4 months, which will also be discussed at each follow up appointment after surgery. Also reviewed supplementation after bariatric surgery. Patient was instructed to take chewable MVI with iron once daily for the first 6 weeks after surgery. Other supplementation will be introduced at 6 week follow up appointment.     Laura Fernandez, MS, RD, LD

## 2024-09-11 NOTE — PATIENT INSTRUCTIONS
The day before surgery, do not take hydrochlorothiazide (water pill) but on the day of surgery, do not take any of your current medications.

## 2024-09-24 ENCOUNTER — ANESTHESIA (OUTPATIENT)
Dept: OPERATING ROOM | Facility: HOSPITAL | Age: 34
End: 2024-09-24
Payer: COMMERCIAL

## 2024-09-24 ENCOUNTER — HOSPITAL ENCOUNTER (INPATIENT)
Facility: HOSPITAL | Age: 34
LOS: 2 days | Discharge: HOME | End: 2024-09-26
Attending: SURGERY | Admitting: SURGERY
Payer: COMMERCIAL

## 2024-09-24 ENCOUNTER — ANESTHESIA EVENT (OUTPATIENT)
Dept: OPERATING ROOM | Facility: HOSPITAL | Age: 34
End: 2024-09-24
Payer: COMMERCIAL

## 2024-09-24 DIAGNOSIS — I10 PRIMARY HYPERTENSION: ICD-10-CM

## 2024-09-24 DIAGNOSIS — G47.33 OBSTRUCTIVE SLEEP APNEA: ICD-10-CM

## 2024-09-24 DIAGNOSIS — E66.01 MORBID OBESITY (MULTI): ICD-10-CM

## 2024-09-24 DIAGNOSIS — E66.01 MORBID OBESITY WITH BMI OF 40.0-44.9, ADULT (MULTI): Primary | ICD-10-CM

## 2024-09-24 DIAGNOSIS — Z98.84 S/P BARIATRIC SURGERY: ICD-10-CM

## 2024-09-24 PROCEDURE — A43775 PR LAP, GAST RESTRICT PROC, LONGITUDINAL GASTRECTOMY: Performed by: NURSE ANESTHETIST, CERTIFIED REGISTERED

## 2024-09-24 PROCEDURE — 2500000004 HC RX 250 GENERAL PHARMACY W/ HCPCS (ALT 636 FOR OP/ED): Performed by: SURGERY

## 2024-09-24 PROCEDURE — 94799 UNLISTED PULMONARY SVC/PX: CPT

## 2024-09-24 PROCEDURE — 2500000005 HC RX 250 GENERAL PHARMACY W/O HCPCS: Performed by: SURGERY

## 2024-09-24 PROCEDURE — 88307 TISSUE EXAM BY PATHOLOGIST: CPT | Mod: TC

## 2024-09-24 PROCEDURE — A43775 PR LAP, GAST RESTRICT PROC, LONGITUDINAL GASTRECTOMY: Performed by: ANESTHESIOLOGY

## 2024-09-24 PROCEDURE — 7100000001 HC RECOVERY ROOM TIME - INITIAL BASE CHARGE: Performed by: SURGERY

## 2024-09-24 PROCEDURE — 2500000001 HC RX 250 WO HCPCS SELF ADMINISTERED DRUGS (ALT 637 FOR MEDICARE OP): Performed by: ANESTHESIOLOGY

## 2024-09-24 PROCEDURE — 2500000005 HC RX 250 GENERAL PHARMACY W/O HCPCS: Performed by: NURSE ANESTHETIST, CERTIFIED REGISTERED

## 2024-09-24 PROCEDURE — 43775 LAP SLEEVE GASTRECTOMY: CPT | Performed by: SURGERY

## 2024-09-24 PROCEDURE — 2500000004 HC RX 250 GENERAL PHARMACY W/ HCPCS (ALT 636 FOR OP/ED): Mod: JZ

## 2024-09-24 PROCEDURE — 2720000007 HC OR 272 NO HCPCS: Performed by: SURGERY

## 2024-09-24 PROCEDURE — 7100000002 HC RECOVERY ROOM TIME - EACH INCREMENTAL 1 MINUTE: Performed by: SURGERY

## 2024-09-24 PROCEDURE — 3700000001 HC GENERAL ANESTHESIA TIME - INITIAL BASE CHARGE: Performed by: SURGERY

## 2024-09-24 PROCEDURE — 94660 CPAP INITIATION&MGMT: CPT

## 2024-09-24 PROCEDURE — 2500000005 HC RX 250 GENERAL PHARMACY W/O HCPCS: Performed by: INTERNAL MEDICINE

## 2024-09-24 PROCEDURE — 1200000002 HC GENERAL ROOM WITH TELEMETRY DAILY

## 2024-09-24 PROCEDURE — 9420000001 HC RT PATIENT EDUCATION 5 MIN

## 2024-09-24 PROCEDURE — 99222 1ST HOSP IP/OBS MODERATE 55: CPT | Performed by: INTERNAL MEDICINE

## 2024-09-24 PROCEDURE — 3600000009 HC OR TIME - EACH INCREMENTAL 1 MINUTE - PROCEDURE LEVEL FOUR: Performed by: SURGERY

## 2024-09-24 PROCEDURE — 3600000004 HC OR TIME - INITIAL BASE CHARGE - PROCEDURE LEVEL FOUR: Performed by: SURGERY

## 2024-09-24 PROCEDURE — 2500000004 HC RX 250 GENERAL PHARMACY W/ HCPCS (ALT 636 FOR OP/ED)

## 2024-09-24 PROCEDURE — 2500000004 HC RX 250 GENERAL PHARMACY W/ HCPCS (ALT 636 FOR OP/ED): Performed by: NURSE ANESTHETIST, CERTIFIED REGISTERED

## 2024-09-24 PROCEDURE — 2500000004 HC RX 250 GENERAL PHARMACY W/ HCPCS (ALT 636 FOR OP/ED): Performed by: ANESTHESIOLOGY

## 2024-09-24 PROCEDURE — 3700000002 HC GENERAL ANESTHESIA TIME - EACH INCREMENTAL 1 MINUTE: Performed by: SURGERY

## 2024-09-24 PROCEDURE — 0DB64Z3 EXCISION OF STOMACH, PERCUTANEOUS ENDOSCOPIC APPROACH, VERTICAL: ICD-10-PCS | Performed by: SURGERY

## 2024-09-24 PROCEDURE — 2780000003 HC OR 278 NO HCPCS: Performed by: SURGERY

## 2024-09-24 RX ORDER — MIDAZOLAM HYDROCHLORIDE 1 MG/ML
INJECTION, SOLUTION INTRAMUSCULAR; INTRAVENOUS AS NEEDED
Status: DISCONTINUED | OUTPATIENT
Start: 2024-09-24 | End: 2024-09-24

## 2024-09-24 RX ORDER — OXYCODONE HCL 10 MG/1
10 TABLET, FILM COATED, EXTENDED RELEASE ORAL ONCE AS NEEDED
Status: DISCONTINUED | OUTPATIENT
Start: 2024-09-24 | End: 2024-09-24

## 2024-09-24 RX ORDER — SODIUM CHLORIDE, SODIUM LACTATE, POTASSIUM CHLORIDE, CALCIUM CHLORIDE 600; 310; 30; 20 MG/100ML; MG/100ML; MG/100ML; MG/100ML
20 INJECTION, SOLUTION INTRAVENOUS CONTINUOUS
Status: DISCONTINUED | OUTPATIENT
Start: 2024-09-24 | End: 2024-09-24

## 2024-09-24 RX ORDER — LABETALOL HYDROCHLORIDE 5 MG/ML
10 INJECTION, SOLUTION INTRAVENOUS ONCE AS NEEDED
Status: DISCONTINUED | OUTPATIENT
Start: 2024-09-24 | End: 2024-09-24 | Stop reason: HOSPADM

## 2024-09-24 RX ORDER — HEPARIN SODIUM 5000 [USP'U]/ML
5000 INJECTION, SOLUTION INTRAVENOUS; SUBCUTANEOUS ONCE
Status: DISCONTINUED | OUTPATIENT
Start: 2024-09-24 | End: 2024-09-24 | Stop reason: HOSPADM

## 2024-09-24 RX ORDER — MEPERIDINE HYDROCHLORIDE 25 MG/ML
12.5 INJECTION INTRAMUSCULAR; INTRAVENOUS; SUBCUTANEOUS EVERY 10 MIN PRN
Status: DISCONTINUED | OUTPATIENT
Start: 2024-09-24 | End: 2024-09-24 | Stop reason: HOSPADM

## 2024-09-24 RX ORDER — FENTANYL CITRATE 50 UG/ML
INJECTION, SOLUTION INTRAMUSCULAR; INTRAVENOUS AS NEEDED
Status: DISCONTINUED | OUTPATIENT
Start: 2024-09-24 | End: 2024-09-24

## 2024-09-24 RX ORDER — HYDROMORPHONE HYDROCHLORIDE 0.2 MG/ML
0.1 INJECTION INTRAMUSCULAR; INTRAVENOUS; SUBCUTANEOUS EVERY 5 MIN PRN
Status: DISCONTINUED | OUTPATIENT
Start: 2024-09-24 | End: 2024-09-24 | Stop reason: HOSPADM

## 2024-09-24 RX ORDER — PROPOFOL 10 MG/ML
INJECTION, EMULSION INTRAVENOUS AS NEEDED
Status: DISCONTINUED | OUTPATIENT
Start: 2024-09-24 | End: 2024-09-24

## 2024-09-24 RX ORDER — METOCLOPRAMIDE 10 MG/1
10 TABLET ORAL ONCE
Status: COMPLETED | OUTPATIENT
Start: 2024-09-24 | End: 2024-09-24

## 2024-09-24 RX ORDER — ALBUTEROL SULFATE 0.83 MG/ML
2.5 SOLUTION RESPIRATORY (INHALATION) ONCE
Status: DISCONTINUED | OUTPATIENT
Start: 2024-09-24 | End: 2024-09-24 | Stop reason: HOSPADM

## 2024-09-24 RX ORDER — HEPARIN SODIUM 5000 [USP'U]/ML
5000 INJECTION, SOLUTION INTRAVENOUS; SUBCUTANEOUS EVERY 8 HOURS SCHEDULED
Status: DISCONTINUED | OUTPATIENT
Start: 2024-09-24 | End: 2024-09-26 | Stop reason: HOSPADM

## 2024-09-24 RX ORDER — LIDOCAINE HYDROCHLORIDE 20 MG/ML
INJECTION, SOLUTION INFILTRATION; PERINEURAL AS NEEDED
Status: DISCONTINUED | OUTPATIENT
Start: 2024-09-24 | End: 2024-09-24

## 2024-09-24 RX ORDER — PANTOPRAZOLE SODIUM 40 MG/10ML
40 INJECTION, POWDER, LYOPHILIZED, FOR SOLUTION INTRAVENOUS
Status: DISCONTINUED | OUTPATIENT
Start: 2024-09-25 | End: 2024-09-26 | Stop reason: HOSPADM

## 2024-09-24 RX ORDER — NORETHINDRONE AND ETHINYL ESTRADIOL 0.5-0.035
50 KIT ORAL ONCE
Status: DISCONTINUED | OUTPATIENT
Start: 2024-09-24 | End: 2024-09-24 | Stop reason: HOSPADM

## 2024-09-24 RX ORDER — PANTOPRAZOLE SODIUM 40 MG/10ML
40 INJECTION, POWDER, LYOPHILIZED, FOR SOLUTION INTRAVENOUS ONCE
Status: COMPLETED | OUTPATIENT
Start: 2024-09-24 | End: 2024-09-24

## 2024-09-24 RX ORDER — OXYCODONE HYDROCHLORIDE 5 MG/1
5 TABLET ORAL ONCE AS NEEDED
Status: DISCONTINUED | OUTPATIENT
Start: 2024-09-24 | End: 2024-09-24

## 2024-09-24 RX ORDER — LIDOCAINE HCL/PF 100 MG/5ML
SYRINGE (ML) INTRAVENOUS AS NEEDED
Status: DISCONTINUED | OUTPATIENT
Start: 2024-09-24 | End: 2024-09-24

## 2024-09-24 RX ORDER — AMLODIPINE BESYLATE 5 MG/1
5 TABLET ORAL DAILY
Status: DISCONTINUED | OUTPATIENT
Start: 2024-09-25 | End: 2024-09-26 | Stop reason: HOSPADM

## 2024-09-24 RX ORDER — HYDRALAZINE HYDROCHLORIDE 20 MG/ML
5 INJECTION INTRAMUSCULAR; INTRAVENOUS EVERY 4 HOURS PRN
Status: DISCONTINUED | OUTPATIENT
Start: 2024-09-24 | End: 2024-09-26 | Stop reason: HOSPADM

## 2024-09-24 RX ORDER — SCOLOPAMINE TRANSDERMAL SYSTEM 1 MG/1
1 PATCH, EXTENDED RELEASE TRANSDERMAL ONCE
Status: DISCONTINUED | OUTPATIENT
Start: 2024-09-24 | End: 2024-09-24

## 2024-09-24 RX ORDER — VANCOMYCIN 2 G/400ML
2000 INJECTION, SOLUTION INTRAVENOUS ONCE
Status: COMPLETED | OUTPATIENT
Start: 2024-09-24 | End: 2024-09-24

## 2024-09-24 RX ORDER — HYDRALAZINE HYDROCHLORIDE 20 MG/ML
5 INJECTION INTRAMUSCULAR; INTRAVENOUS EVERY 4 HOURS PRN
Status: DISCONTINUED | OUTPATIENT
Start: 2024-09-24 | End: 2024-09-24 | Stop reason: SDUPTHER

## 2024-09-24 RX ORDER — ROCURONIUM BROMIDE 10 MG/ML
INJECTION, SOLUTION INTRAVENOUS AS NEEDED
Status: DISCONTINUED | OUTPATIENT
Start: 2024-09-24 | End: 2024-09-24

## 2024-09-24 RX ORDER — PHENYLEPHRINE HCL IN 0.9% NACL 1 MG/10 ML
SYRINGE (ML) INTRAVENOUS AS NEEDED
Status: DISCONTINUED | OUTPATIENT
Start: 2024-09-24 | End: 2024-09-24

## 2024-09-24 RX ORDER — SODIUM CHLORIDE, SODIUM LACTATE, POTASSIUM CHLORIDE, CALCIUM CHLORIDE 600; 310; 30; 20 MG/100ML; MG/100ML; MG/100ML; MG/100ML
100 INJECTION, SOLUTION INTRAVENOUS CONTINUOUS
Status: DISCONTINUED | OUTPATIENT
Start: 2024-09-24 | End: 2024-09-24 | Stop reason: HOSPADM

## 2024-09-24 RX ORDER — SODIUM CHLORIDE, SODIUM LACTATE, POTASSIUM CHLORIDE, CALCIUM CHLORIDE 600; 310; 30; 20 MG/100ML; MG/100ML; MG/100ML; MG/100ML
125 INJECTION, SOLUTION INTRAVENOUS CONTINUOUS
Status: DISCONTINUED | OUTPATIENT
Start: 2024-09-24 | End: 2024-09-26 | Stop reason: HOSPADM

## 2024-09-24 RX ORDER — KETOROLAC TROMETHAMINE 30 MG/ML
15 INJECTION, SOLUTION INTRAMUSCULAR; INTRAVENOUS ONCE AS NEEDED
Status: DISCONTINUED | OUTPATIENT
Start: 2024-09-24 | End: 2024-09-24

## 2024-09-24 RX ORDER — HYDRALAZINE HYDROCHLORIDE 20 MG/ML
10 INJECTION INTRAMUSCULAR; INTRAVENOUS EVERY 30 MIN PRN
Status: DISCONTINUED | OUTPATIENT
Start: 2024-09-24 | End: 2024-09-24 | Stop reason: HOSPADM

## 2024-09-24 RX ORDER — BUPRENORPHINE HYDROCHLORIDE 0.32 MG/ML
0.1 INJECTION INTRAMUSCULAR; INTRAVENOUS EVERY 6 HOURS PRN
Status: DISCONTINUED | OUTPATIENT
Start: 2024-09-24 | End: 2024-09-25

## 2024-09-24 RX ORDER — FAMOTIDINE 20 MG/1
20 TABLET, FILM COATED ORAL ONCE
Status: DISCONTINUED | OUTPATIENT
Start: 2024-09-24 | End: 2024-09-24 | Stop reason: HOSPADM

## 2024-09-24 RX ORDER — DIPHENHYDRAMINE HYDROCHLORIDE 50 MG/ML
12.5 INJECTION INTRAMUSCULAR; INTRAVENOUS ONCE AS NEEDED
Status: DISCONTINUED | OUTPATIENT
Start: 2024-09-24 | End: 2024-09-24 | Stop reason: HOSPADM

## 2024-09-24 RX ORDER — ACETAMINOPHEN 10 MG/ML
1000 INJECTION, SOLUTION INTRAVENOUS EVERY 6 HOURS
Status: DISCONTINUED | OUTPATIENT
Start: 2024-09-24 | End: 2024-09-25

## 2024-09-24 RX ORDER — METOPROLOL TARTRATE 1 MG/ML
5 INJECTION, SOLUTION INTRAVENOUS EVERY 6 HOURS
Status: DISCONTINUED | OUTPATIENT
Start: 2024-09-24 | End: 2024-09-26 | Stop reason: HOSPADM

## 2024-09-24 RX ORDER — METOCLOPRAMIDE HYDROCHLORIDE 5 MG/ML
10 INJECTION INTRAMUSCULAR; INTRAVENOUS EVERY 6 HOURS PRN
Status: DISCONTINUED | OUTPATIENT
Start: 2024-09-24 | End: 2024-09-26 | Stop reason: HOSPADM

## 2024-09-24 RX ORDER — LIDOCAINE HYDROCHLORIDE 10 MG/ML
0.1 INJECTION, SOLUTION INFILTRATION; PERINEURAL ONCE
Status: DISCONTINUED | OUTPATIENT
Start: 2024-09-24 | End: 2024-09-24 | Stop reason: HOSPADM

## 2024-09-24 RX ORDER — ONDANSETRON HYDROCHLORIDE 2 MG/ML
4 INJECTION, SOLUTION INTRAVENOUS ONCE AS NEEDED
Status: DISCONTINUED | OUTPATIENT
Start: 2024-09-24 | End: 2024-09-24 | Stop reason: HOSPADM

## 2024-09-24 RX ORDER — METOCLOPRAMIDE HYDROCHLORIDE 5 MG/ML
10 INJECTION INTRAMUSCULAR; INTRAVENOUS ONCE
Status: COMPLETED | OUTPATIENT
Start: 2024-09-24 | End: 2024-09-24

## 2024-09-24 RX ORDER — ONDANSETRON HYDROCHLORIDE 2 MG/ML
INJECTION, SOLUTION INTRAVENOUS AS NEEDED
Status: DISCONTINUED | OUTPATIENT
Start: 2024-09-24 | End: 2024-09-24

## 2024-09-24 RX ORDER — MIDAZOLAM HYDROCHLORIDE 1 MG/ML
1 INJECTION, SOLUTION INTRAMUSCULAR; INTRAVENOUS ONCE AS NEEDED
Status: DISCONTINUED | OUTPATIENT
Start: 2024-09-24 | End: 2024-09-24 | Stop reason: HOSPADM

## 2024-09-24 RX ORDER — NALOXONE HYDROCHLORIDE 0.4 MG/ML
0.2 INJECTION, SOLUTION INTRAMUSCULAR; INTRAVENOUS; SUBCUTANEOUS EVERY 5 MIN PRN
Status: DISCONTINUED | OUTPATIENT
Start: 2024-09-24 | End: 2024-09-26 | Stop reason: HOSPADM

## 2024-09-24 RX ORDER — ACETAMINOPHEN 10 MG/ML
1000 INJECTION, SOLUTION INTRAVENOUS ONCE
Status: COMPLETED | OUTPATIENT
Start: 2024-09-24 | End: 2024-09-24

## 2024-09-24 RX ORDER — LEVOTHYROXINE SODIUM 50 UG/1
50 TABLET ORAL DAILY
Status: DISCONTINUED | OUTPATIENT
Start: 2024-09-25 | End: 2024-09-26 | Stop reason: HOSPADM

## 2024-09-24 RX ORDER — ONDANSETRON HYDROCHLORIDE 2 MG/ML
4 INJECTION, SOLUTION INTRAVENOUS EVERY 8 HOURS PRN
Status: DISCONTINUED | OUTPATIENT
Start: 2024-09-24 | End: 2024-09-26 | Stop reason: HOSPADM

## 2024-09-24 RX ORDER — ALBUTEROL SULFATE 0.83 MG/ML
2.5 SOLUTION RESPIRATORY (INHALATION) ONCE AS NEEDED
Status: DISCONTINUED | OUTPATIENT
Start: 2024-09-24 | End: 2024-09-24 | Stop reason: HOSPADM

## 2024-09-24 SDOH — HEALTH STABILITY: MENTAL HEALTH: CURRENT SMOKER: 0

## 2024-09-24 SDOH — SOCIAL STABILITY: SOCIAL INSECURITY: ARE YOU OR HAVE YOU BEEN THREATENED OR ABUSED PHYSICALLY, EMOTIONALLY, OR SEXUALLY BY ANYONE?: NO

## 2024-09-24 SDOH — SOCIAL STABILITY: SOCIAL INSECURITY: ARE THERE ANY APPARENT SIGNS OF INJURIES/BEHAVIORS THAT COULD BE RELATED TO ABUSE/NEGLECT?: NO

## 2024-09-24 SDOH — SOCIAL STABILITY: SOCIAL INSECURITY: HAVE YOU HAD ANY THOUGHTS OF HARMING ANYONE ELSE?: NO

## 2024-09-24 SDOH — SOCIAL STABILITY: SOCIAL INSECURITY: DOES ANYONE TRY TO KEEP YOU FROM HAVING/CONTACTING OTHER FRIENDS OR DOING THINGS OUTSIDE YOUR HOME?: NO

## 2024-09-24 SDOH — SOCIAL STABILITY: SOCIAL INSECURITY: ABUSE: ADULT

## 2024-09-24 SDOH — SOCIAL STABILITY: SOCIAL INSECURITY: HAS ANYONE EVER THREATENED TO HURT YOUR FAMILY OR YOUR PETS?: NO

## 2024-09-24 SDOH — SOCIAL STABILITY: SOCIAL INSECURITY: DO YOU FEEL ANYONE HAS EXPLOITED OR TAKEN ADVANTAGE OF YOU FINANCIALLY OR OF YOUR PERSONAL PROPERTY?: NO

## 2024-09-24 SDOH — SOCIAL STABILITY: SOCIAL INSECURITY: WERE YOU ABLE TO COMPLETE ALL THE BEHAVIORAL HEALTH SCREENINGS?: YES

## 2024-09-24 SDOH — SOCIAL STABILITY: SOCIAL INSECURITY: HAVE YOU HAD THOUGHTS OF HARMING ANYONE ELSE?: NO

## 2024-09-24 SDOH — SOCIAL STABILITY: SOCIAL INSECURITY: DO YOU FEEL UNSAFE GOING BACK TO THE PLACE WHERE YOU ARE LIVING?: NO

## 2024-09-24 ASSESSMENT — PAIN SCALES - GENERAL
PAINLEVEL_OUTOF10: 0 - NO PAIN
PAINLEVEL_OUTOF10: 0 - NO PAIN
PAINLEVEL_OUTOF10: 10 - WORST POSSIBLE PAIN
PAINLEVEL_OUTOF10: 0 - NO PAIN
PAIN_LEVEL: 1

## 2024-09-24 ASSESSMENT — COGNITIVE AND FUNCTIONAL STATUS - GENERAL
CLIMB 3 TO 5 STEPS WITH RAILING: A LITTLE
CLIMB 3 TO 5 STEPS WITH RAILING: A LITTLE
MOBILITY SCORE: 23
DAILY ACTIVITIY SCORE: 24
MOBILITY SCORE: 23
PATIENT BASELINE BEDBOUND: NO
DAILY ACTIVITIY SCORE: 24

## 2024-09-24 ASSESSMENT — LIFESTYLE VARIABLES
HOW OFTEN DO YOU HAVE A DRINK CONTAINING ALCOHOL: NEVER
SUBSTANCE_ABUSE_PAST_12_MONTHS: NO
SKIP TO QUESTIONS 9-10: 1
AUDIT-C TOTAL SCORE: 0
HOW MANY STANDARD DRINKS CONTAINING ALCOHOL DO YOU HAVE ON A TYPICAL DAY: PATIENT DOES NOT DRINK
AUDIT-C TOTAL SCORE: 0
PRESCIPTION_ABUSE_PAST_12_MONTHS: NO
HOW OFTEN DO YOU HAVE 6 OR MORE DRINKS ON ONE OCCASION: NEVER

## 2024-09-24 ASSESSMENT — ENCOUNTER SYMPTOMS
CONSTIPATION: 0
DYSURIA: 0
DIARRHEA: 0
HEADACHES: 0
SHORTNESS OF BREATH: 0
NAUSEA: 0
FEVER: 0
WEAKNESS: 0
COUGH: 0
BACK PAIN: 1
ABDOMINAL PAIN: 0
DIFFICULTY URINATING: 0
FREQUENCY: 0
CHILLS: 0
ARTHRALGIAS: 1

## 2024-09-24 ASSESSMENT — ACTIVITIES OF DAILY LIVING (ADL)
HEARING - LEFT EAR: FUNCTIONAL
PATIENT'S MEMORY ADEQUATE TO SAFELY COMPLETE DAILY ACTIVITIES?: YES
ADEQUATE_TO_COMPLETE_ADL: YES
DRESSING YOURSELF: INDEPENDENT
WALKS IN HOME: INDEPENDENT
HEARING - RIGHT EAR: FUNCTIONAL
JUDGMENT_ADEQUATE_SAFELY_COMPLETE_DAILY_ACTIVITIES: YES
BATHING: INDEPENDENT
GROOMING: INDEPENDENT
LACK_OF_TRANSPORTATION: NO
TOILETING: INDEPENDENT
FEEDING YOURSELF: INDEPENDENT

## 2024-09-24 ASSESSMENT — COLUMBIA-SUICIDE SEVERITY RATING SCALE - C-SSRS
1. IN THE PAST MONTH, HAVE YOU WISHED YOU WERE DEAD OR WISHED YOU COULD GO TO SLEEP AND NOT WAKE UP?: NO
6. HAVE YOU EVER DONE ANYTHING, STARTED TO DO ANYTHING, OR PREPARED TO DO ANYTHING TO END YOUR LIFE?: NO
2. HAVE YOU ACTUALLY HAD ANY THOUGHTS OF KILLING YOURSELF?: NO

## 2024-09-24 ASSESSMENT — PATIENT HEALTH QUESTIONNAIRE - PHQ9
2. FEELING DOWN, DEPRESSED OR HOPELESS: NOT AT ALL
1. LITTLE INTEREST OR PLEASURE IN DOING THINGS: NOT AT ALL
SUM OF ALL RESPONSES TO PHQ9 QUESTIONS 1 & 2: 0

## 2024-09-24 ASSESSMENT — PAIN - FUNCTIONAL ASSESSMENT
PAIN_FUNCTIONAL_ASSESSMENT: 0-10

## 2024-09-24 ASSESSMENT — PAIN SCALES - WONG BAKER: WONGBAKER_NUMERICALRESPONSE: NO HURT

## 2024-09-24 ASSESSMENT — PAIN DESCRIPTION - DESCRIPTORS: DESCRIPTORS: ACHING;DISCOMFORT

## 2024-09-24 NOTE — OP NOTE
"Gastric Sleeve Laparoscopic **PAT ON ADMIT** Operative Note     Date: 2024  OR Location: [facility]    Name: Angelito Burgos \"HANK\", : 1990, Age: 34 y.o., MRN: [id number], Sex: male    Diagnosis  Pre-op Diagnosis      * Morbid obesity (Multi) [E66.01]     * Obstructive sleep apnea [G47.33]     * Primary hypertension [I10] Post-op Diagnosis     * Morbid obesity (Multi) [E66.01]     * Obstructive sleep apnea [G47.33]     * Primary hypertension [I10]     Procedures  Gastric Sleeve Laparoscopic **PAT ON ADMIT**  [address] - [place] LAPS GSTRC RSTRICTIV PX LONGITUDINAL GASTRECTOMY      Surgeons      * Jus Shaver - Primary    Resident/Fellow/Other Assistant:  Surgeons and Role:  * No surgeons found with a matching role *    Procedure Summary  Anesthesia: Anesthesia type not filed in the log.  ASA: III  Anesthesia Staff: Anesthesiologist: Otoniel Kilgore DO  CRNA: SHAHEEN Henning-CRNA  Estimated Blood Loss: 0mL  Intra-op Medications:   Administrations occurring from 0745 to 1100 on 24:   Medication Name Total Dose   BUPivacaine HCl (Marcaine) 0.5 % (5 mg/mL) 30 mL, lidocaine (Xylocaine) 30 mL in sodium chloride 0.9% 60 mL syringe 120 mL   lactated Ringer's infusion 301.67 mL              Anesthesia Record               Intraprocedure I/O Totals       None           Specimen:   ID Type Source Tests Collected by Time   1 : Gastric Remnant Tissue STOMACH SLEEVE RESECTION SURGICAL PATHOLOGY EXAM Jus Shaver MD 2024 1016        Staff:   Circulator: Dodie  Scrub Person: Chantal ANDREWS: Lisandra  Scrub Person: Dandre      Procedure: Laparoscopic vertical sleeve gastrectomy    Findings: No hiatal hernia    Indications: HANK Burgos is an 34 y.o. male who is having surgery for Morbid obesity (Multi) [E66.01].  HANK is a 34-year-old that weighs 286 pounds and has a BMI of 44.  He suffers from obstructive sleep apnea and hypertension.  He has failed maximal medical times weight loss prompting referral for surgical " intervention.  He wished to proceed with vertical sleeve gastrectomy.  Risks and benefits of surgery were explained in extensive detail prior to obtaining informed consent.  He understood the risk of death, venous thromboembolic event, GI tract injury or leak, bleeding, nonresolution of comorbid conditions, inadequate weight loss, need for lifelong supplementation, need for lifestyle modification potential for nutritional deficiency, development of gastroesophageal reflux disease.  HANK and his mother were seen in the preoperative area. The risks, benefits, complications, treatment options, non-operative alternatives, expected recovery and outcomes were discussed with the patient. The possibilities of reaction to medication, pulmonary aspiration, injury to surrounding structures, bleeding, recurrent infection, the need for additional procedures, failure to diagnose a condition, and creating a complication requiring transfusion or operation were discussed with the patient. The patient concurred with the proposed plan, giving informed consent.  The site of surgery was properly noted/marked if necessary per policy. The patient has been actively warmed in preoperative area. Preoperative antibiotics have been ordered and given within 1 hours of incision. Venous thrombosis prophylaxis have been ordered including bilateral sequential compression devices and chemical prophylaxis    Procedure Details: HANK was given antibiotics and subcutaneous heparin in the preoperative holding area.  He was brought to the operating room.  Sequential compression devices were placed.  Preoperative huddle was completed.  He was given IV sedation and intubated.  He was placed in splenic position.  His abdomen is prepped and draped in a sterile fashion.  I entered the abdomen in the left midclavicular line 3 fingerbreadths below the costal margin with a 0 degree scope and a 5 mm trocar.  I insufflated the abdomen and switched an angled scope.   There was no trauma from entry.  I placed the remainder my trocars and a subxiphoid Arthur liver retractor.  He had a large liver consistent with MASH.  I excised the fat pad over the gastroesophageal junction.  I mobilized the greater curvature the stomach starting 4 cm from the pylorus and carrying the dissection all the way up to the left sarah beth.  I freed up the retrogastric adhesions.  The gastroesophageal junction was in the peritoneal cavity.  There was no evidence of a hiatal hernia.  I had anesthesia give an amp of glucagon.  I marked the stomach a centimeter from the angle of Hiss, 3 cm from the incisura, and 5 cm from the pylorus.  I had anesthesia pass a 38 [place] Teleflex bougie and inflate the balloon to 60 mL.  I then placed my stapler on my marks.  I went through the prefire and checklist to make sure I was not narrowing the angle of Hiss and that I was equidistant anterior and posterior.  I fired my stapler after deflating the balloon and removing the bougie.  I oversewed the entire staple line with a 2-0 Vicryl imbricating the top 2 cm and attaching the lower part of the stomach to the cut edge of the omentum.  Because he had a thick stomach I put fibrin sealant on the staple line.  I removed my subxiphoid Arthur liver retractor.  I removed the gastric remnant through the 19 mm fascial defect.  I closed this defect with 0 Vicryl x 2.  I closed the 12 mm fascial defect with 0 Vicryl.  I performed bilateral tap block with dilute Marcaine.  I removed the remaining trocars under direct vision.  I irrigated subcutaneous tissue.  I closed the skin with 4-0 undyed Monocryl.  I placed Dermabond.  The patient's anesthetic was reversed, he was extubated, and brought to the recovery room in stable condition.  All counts were correct.  TJ tolerated procedure well.  He was placed on CPAP per protocol for patients with sleep apnea.  Complications:  None; patient tolerated the procedure well.    Disposition:  PACU - hemodynamically stable.  Condition: stable       Jus GregoryLeticiar  Phone Number: [phone]

## 2024-09-24 NOTE — NURSING NOTE
Pt  resting in bed. No complaints or concerns. Call light within reach. Pt continues on IV fluids per order.  Pt npo per order.

## 2024-09-24 NOTE — CONSULTS
Reason For Consult  Sleep apnea     Pulmonary Consultation Note   Subjective    Angelito Burgos is a 34 y.o. year old male patient known with DEISY, GERD, obesity admitted on 9/24/2024 with following:    -S/p Gastric sleeve laparoscopic surgery on 9/24  -DEISY: severe in nature  -GERD    History of Present Illness:  Patient denies any history of lung issues or asthma,. He mentions severe DEISY diagnosed a couple months ago. He uses CPAP 4-20 mmHg. He curently denies dyspnea, cough, leg swelling. He mentions a transient ache in chest that he thins was gas. He denies any use of inhalers. Sleep has improved after CPAP used.     According to nursing patient walked today on RA and did no require any oxygen.     Past Medical History  He has a past medical history of Acid reflux, Blurred vision, Dizziness, Fatigue, Foot pain, Frequent urination, Knee pain, Migraines, Morbid obesity (Multi), and Shortness of breath.    Surgical History  He has a past surgical history that includes Other surgical history.     Social History  He reports that he has never smoked. He has never been exposed to tobacco smoke. He has never used smokeless tobacco. He reports that he does not currently use alcohol. He reports that he does not use drugs.  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       Family History  Family History   Problem Relation Name Age of Onset   • Other (MORBID OBESITY) Mother     • Heart attack Father     • Other (HTN) Father     • Kidney disease Father     • Other (BERGERS DISEASE) Father     • Diabetes Father     • Other (HEALING PROBLEMS) Father          Allergies  Keflex [cephalexin]    Review of Systems     As above     Meds    Scheduled medications  acetaminophen, 1,000 mg, intravenous, q6h  [START ON 9/25/2024] amLODIPine, 5 mg, oral, Daily  heparin (porcine), 5,000 Units, subcutaneous, q8h WADE  [START ON 9/25/2024] levothyroxine, 50  "mcg, oral, Daily  metoprolol, 5 mg, intravenous, q6h  oxygen, , inhalation, Continuous - 02/gases  oxygen, 2 L/min, inhalation, Continuous - 02/gases  [START ON 9/25/2024] pantoprazole, 40 mg, intravenous, Daily before breakfast      Continuous medications  lactated Ringer's, 125 mL/hr, Last Rate: 125 mL/hr (09/24/24 1322)      PRN medications  PRN medications: buprenorphine, hydrALAZINE, metoclopramide, naloxone, ondansetron, oxygen     Objective      Last Recorded Vitals  /64 (BP Location: Left arm, Patient Position: Lying)   Pulse 73   Temp 37.4 °C (99.3 °F) (Axillary)   Resp 19   Wt 125 kg (275 lb)   SpO2 100%     Blood pressure 128/64, pulse 73, temperature 37.4 °C (99.3 °F), temperature source Axillary, resp. rate 19, height 1.715 m (5' 7.52\"), weight 125 kg (275 lb), SpO2 100%.   Physical Exam   GENERAL: . No respiratory distress  HEAD/SINUSES: wearing CPAP machine   NECK: large neck   LUNGS: good air entry bilaterally anteriorly  CARDIAC: RRR  EXTREMITIES: No edema, stocking present   NEURO: grossly normal mental status,   SKIN: Skin turgor normal. No rashes or lesions.   PSYCH: Normal affect    Intake/Output Summary (Last 24 hours) at 9/24/2024 1737  Last data filed at 9/24/2024 1400  Gross per 24 hour   Intake 1879.17 ml   Output 500 ml   Net 1379.17 ml     Labs:                      Micro/ID:   No results found for: \"URINECULTURE\", \"BLOODCULT\", \"CSFCULTSMEAR\"  Summary of key imaging results from the last 24 hours  No imaging of the chest     Impression   Angelito Burgos is a 34 y.o. year old male patient is being seen by the pulmonary service for   S/p Gastric sleeve laparoscopic surgery on 9/24  -DEISY: severe in nature  -GERD    Recommendations   As follows:  Recommend continuing NIV while at night and with naps and after surgery, continue home CPAP use which is auto at 4-20 mmHg  Bronchopulmonary hygiene with Incentive spirometry to prevent atelectasis   Patient already ambulated today and did " not need oxygen.   DVT prophylaxis  Pulmonary will sign off, please call with any questions or if patient requires oxygen overnight or in the AM.     Fina Blanco MD   09/24/24 at 5:37 PM     Disclaimer: Documentation completed with the information available at the time of input. Parts of this note may have been scribed or generated using voice dictation software, Dragon.  Homophonic errors may exist.  Please contact me directly if clarification is needed. The times in the chart may not be reflective of actual patient care times, interventions, or procedures. Documentation occurs after the physical care of the patient.

## 2024-09-24 NOTE — ANESTHESIA POSTPROCEDURE EVALUATION
"Patient: Angelito Burgos \"TJ\"    Procedure Summary       Date: 09/24/24 Room / Location: [facility] OR 11 / [facility]    Anesthesia Start: 0759 Anesthesia Stop:     Procedure: Gastric Sleeve Laparoscopic **PAT ON ADMIT** Diagnosis:       Morbid obesity (Multi)      Obstructive sleep apnea      Primary hypertension      (Morbid obesity (Multi) [E66.01])    Surgeons: Jus Shaver MD Responsible Provider: Otoniel Kilgore DO    Anesthesia Type: general ASA Status: 3            Anesthesia Type: general  /73   Pulse 75   Temp 36.2 °C (97.2 °F) (Temporal)   Resp 18   Ht 1.715 m (5' 7.52\")   Wt 125 kg (275 lb)   SpO2 97%   BMI 42.41 kg/m²         Anesthesia Post Evaluation    Patient location during evaluation: bedside  Patient participation: complete - patient participated  Level of consciousness: sleepy but conscious  Pain score: 1  Pain management: adequate  Airway patency: patent  Two or more strategies used to mitigate risk of obstructive sleep apnea  Cardiovascular status: acceptable  Respiratory status: acceptable  Hydration status: acceptable  Postoperative Nausea and Vomiting: none    Sleepy with oral aitrway in place on rebreather mask.  This is a situation [facility] is displeased with because it is unacceptable.  I was confused by this.  No notable events documented.    "

## 2024-09-24 NOTE — CONSULTS
"ConsultsReason For Consult  HTN  Sleep apnea  History Of Present Illness  Angelito Burgos \"TJ\" is a 34 y.o. male presenting for bariatric surgery. Denies SOB, nausea, epigastric pain tolerable  Past Medical History  He has a past medical history of Acid reflux, Blurred vision, Dizziness, Fatigue, Foot pain, Frequent urination, Knee pain, Migraines, Morbid obesity (Multi), and Shortness of breath.    Surgical History  He has a past surgical history that includes Other surgical history.     Social History  He reports that he has never smoked. He has never been exposed to tobacco smoke. He has never used smokeless tobacco. He reports that he does not currently use alcohol. He reports that he does not use drugs.    Family History  Family History   Problem Relation Name Age of Onset    Other (MORBID OBESITY) Mother      Heart attack Father      Other (HTN) Father      Kidney disease Father      Other (BERGERS DISEASE) Father      Diabetes Father      Other (HEALING PROBLEMS) Father          Allergies  Keflex [cephalexin]    Review of Systems    Review of Systems   Constitutional:  Negative for chills and fever.             HENT:  Negative for dental problem.    Respiratory:  Negative for cough and shortness of breath.    Gastrointestinal:  Negative for abdominal pain, constipation, diarrhea and nausea.   Genitourinary:  Negative for difficulty urinating, dysuria and frequency.   Musculoskeletal:  Positive for arthralgias and back pain.   Neurological:  Negative for weakness and headaches.         Physical Exam  Physical Exam  Constitutional:       General: He is not in acute distress.     Appearance: He is obese.   HENT:      Mouth/Throat:      Comments: Dentition WNL  Cardiovascular:      Rate and Rhythm: Normal rate and regular rhythm.      Heart sounds: Normal heart sounds.   Pulmonary:      Breath sounds: Normal breath sounds.   Abdominal:      Palpations: Abdomen is soft.      Tenderness: There is no abdominal " tenderness.   Musculoskeletal:      Cervical back: No tenderness.      Right lower leg: No edema.      Left lower leg: No edema.   Lymphadenopathy:      Cervical: No cervical adenopathy.   Skin:     General: Skin is warm.      Findings: No erythema.   Neurological:      Mental Status: He is alert and oriented to person, place, and time.      Gait: Gait is intact. Gait normal.   Psychiatric:         Mood and Affect: Mood normal.         Behavior: Behavior normal.              Assessment and plan  HTN- will monitor BP  Sleep apnea- will use CPAP

## 2024-09-24 NOTE — CARE PLAN
The patient's goals for the shift include To sit in the chair and walk later    The clinical goals for the shift include Manage pain

## 2024-09-24 NOTE — ANESTHESIA PROCEDURE NOTES
Airway  Date/Time: 9/24/2024 8:12 AM  Urgency: elective    Airway not difficult    Staffing  Performed: CRNA   Authorized by: Otoniel Kilgore DO    Performed by: SHAHEEN Henning-DEANA  Patient location during procedure: OR    Indications and Patient Condition  Indications for airway management: anesthesia  Spontaneous Ventilation: absent  Sedation level: deep  Preoxygenated: yes  Patient position: sniffing  Mask difficulty assessment: 1 - vent by mask    Final Airway Details  Final airway type: endotracheal airway      Successful airway: ETT  Cuffed: yes   Successful intubation technique: video laryngoscopy  Facilitating devices/methods: intubating stylet  Endotracheal tube insertion site: oral  Blade: Kayla  Blade size: #3  ETT size (mm): 7.5  Cormack-Lehane Classification: grade I - full view of glottis  Placement verified by: chest auscultation and capnometry   Cuff volume (mL): 8  Measured from: teeth  ETT to teeth (cm): 22  Number of attempts at approach: 1    Additional Comments  Edematous glottis and epiglottis, small glottic opening

## 2024-09-24 NOTE — CARE PLAN
The patient's goals for the shift include walking the halls    The clinical goals for the shift include Manage pain

## 2024-09-24 NOTE — NURSING NOTE
Pt A&O x3 arrived to floor from pacu. No s/s of pain or discomfort.  Mother in at bedside. Pt still sleepy from surgery.  Cpap in place per order. Pt to be orientated to room, call light, and phone.  Call light within reach.  Pt on IV fluids per order.  Pt npo per order.

## 2024-09-24 NOTE — ANESTHESIA PREPROCEDURE EVALUATION
"Patient: Angelito Burgos \"TJ\"    Procedure Information       Date/Time: 09/24/24 0745    Procedure: Gastric Sleeve Laparoscopic **PAT ON ADMIT**    Location: [facility] OR 11 / [facility]    Surgeons: Jus Shaver MD            Relevant Problems   GI   (+) Gastroesophageal reflux disease without esophagitis      Endocrine   (+) Morbid obesity (Multi)       Clinical information reviewed:   Tobacco  Allergies  Meds   Med Hx  Surg Hx   Fam Hx  Soc Hx        NPO Detail:  NPO/Void Status  Carbohydrate Drink Given Prior to Surgery? : N  Date of Last Liquid: 09/22/24  Time of Last Liquid: 2130  Date of Last Solid: 09/23/24  Time of Last Solid: 2300  Last Intake Type: Clear fluids  Time of Last Void: 0600         Physical Exam    Airway  Mallampati: II  TM distance: >3 FB     Cardiovascular   Rhythm: regular  Rate: normal     Dental - normal exam     Pulmonary   Breath sounds clear to auscultation     Abdominal   Abdomen: soft             Anesthesia Plan    History of general anesthesia?: yes  History of complications of general anesthesia?: no    ASA 3     general   (EKG 8/2024 nl, labs acceptable)  The patient is not a current smoker.    intravenous induction   Postoperative administration of opioids is intended.  Anesthetic plan and risks discussed with patient.    Plan discussed with CRNA, attending and CAA.      "

## 2024-09-25 ENCOUNTER — PHARMACY VISIT (OUTPATIENT)
Dept: PHARMACY | Facility: CLINIC | Age: 34
End: 2024-09-25
Payer: MEDICARE

## 2024-09-25 ENCOUNTER — APPOINTMENT (OUTPATIENT)
Dept: RADIOLOGY | Facility: HOSPITAL | Age: 34
End: 2024-09-25
Payer: COMMERCIAL

## 2024-09-25 PROCEDURE — 2500000005 HC RX 250 GENERAL PHARMACY W/O HCPCS: Performed by: INTERNAL MEDICINE

## 2024-09-25 PROCEDURE — 2500000001 HC RX 250 WO HCPCS SELF ADMINISTERED DRUGS (ALT 637 FOR MEDICARE OP): Performed by: INTERNAL MEDICINE

## 2024-09-25 PROCEDURE — 74240 X-RAY XM UPR GI TRC 1CNTRST: CPT

## 2024-09-25 PROCEDURE — 99232 SBSQ HOSP IP/OBS MODERATE 35: CPT | Performed by: INTERNAL MEDICINE

## 2024-09-25 PROCEDURE — 1200000002 HC GENERAL ROOM WITH TELEMETRY DAILY

## 2024-09-25 PROCEDURE — RXMED WILLOW AMBULATORY MEDICATION CHARGE

## 2024-09-25 PROCEDURE — 2500000004 HC RX 250 GENERAL PHARMACY W/ HCPCS (ALT 636 FOR OP/ED): Performed by: SURGERY

## 2024-09-25 PROCEDURE — 2500000001 HC RX 250 WO HCPCS SELF ADMINISTERED DRUGS (ALT 637 FOR MEDICARE OP): Performed by: SURGERY

## 2024-09-25 PROCEDURE — 74246 X-RAY XM UPR GI TRC 2CNTRST: CPT | Performed by: RADIOLOGY

## 2024-09-25 PROCEDURE — 2550000001 HC RX 255 CONTRASTS: Performed by: SURGERY

## 2024-09-25 PROCEDURE — RXOTC WILLOW AMBULATORY OTC CHARGE

## 2024-09-25 RX ORDER — OXYCODONE HCL 5 MG/5 ML
5 SOLUTION, ORAL ORAL EVERY 6 HOURS PRN
Status: DISCONTINUED | OUTPATIENT
Start: 2024-09-25 | End: 2024-09-26 | Stop reason: HOSPADM

## 2024-09-25 RX ORDER — ACETAMINOPHEN 160 MG/5ML
650 SOLUTION ORAL EVERY 6 HOURS PRN
Start: 2024-09-25

## 2024-09-25 RX ORDER — OXYCODONE HCL 5 MG/5 ML
5 SOLUTION, ORAL ORAL EVERY 6 HOURS PRN
Qty: 90 ML | Refills: 0 | Status: SHIPPED | OUTPATIENT
Start: 2024-09-25 | End: 2024-09-30

## 2024-09-25 RX ORDER — ACETAMINOPHEN 160 MG/5ML
650 SOLUTION ORAL EVERY 6 HOURS PRN
Status: DISCONTINUED | OUTPATIENT
Start: 2024-09-25 | End: 2024-09-26 | Stop reason: HOSPADM

## 2024-09-25 RX ORDER — OXYCODONE HCL 5 MG/5 ML
5 SOLUTION, ORAL ORAL EVERY 6 HOURS PRN
Status: DISCONTINUED | OUTPATIENT
Start: 2024-09-25 | End: 2024-09-25

## 2024-09-25 SDOH — SOCIAL STABILITY: SOCIAL NETWORK: HOW OFTEN DO YOU ATTEND CHURCH OR RELIGIOUS SERVICES?: 1 TO 4 TIMES PER YEAR

## 2024-09-25 SDOH — ECONOMIC STABILITY: HOUSING INSECURITY: AT ANY TIME IN THE PAST 12 MONTHS, WERE YOU HOMELESS OR LIVING IN A SHELTER (INCLUDING NOW)?: NO

## 2024-09-25 SDOH — ECONOMIC STABILITY: HOUSING INSECURITY: IN THE PAST 12 MONTHS, HOW MANY TIMES HAVE YOU MOVED WHERE YOU WERE LIVING?: 0

## 2024-09-25 SDOH — SOCIAL STABILITY: SOCIAL INSECURITY: WITHIN THE LAST YEAR, HAVE YOU BEEN AFRAID OF YOUR PARTNER OR EX-PARTNER?: NO

## 2024-09-25 SDOH — SOCIAL STABILITY: SOCIAL NETWORK: IN A TYPICAL WEEK, HOW MANY TIMES DO YOU TALK ON THE PHONE WITH FAMILY, FRIENDS, OR NEIGHBORS?: THREE TIMES A WEEK

## 2024-09-25 SDOH — ECONOMIC STABILITY: INCOME INSECURITY: IN THE LAST 12 MONTHS, WAS THERE A TIME WHEN YOU WERE NOT ABLE TO PAY THE MORTGAGE OR RENT ON TIME?: NO

## 2024-09-25 SDOH — ECONOMIC STABILITY: FOOD INSECURITY: WITHIN THE PAST 12 MONTHS, THE FOOD YOU BOUGHT JUST DIDN'T LAST AND YOU DIDN'T HAVE MONEY TO GET MORE.: NEVER TRUE

## 2024-09-25 SDOH — SOCIAL STABILITY: SOCIAL INSECURITY
WITHIN THE LAST YEAR, HAVE TO BEEN RAPED OR FORCED TO HAVE ANY KIND OF SEXUAL ACTIVITY BY YOUR PARTNER OR EX-PARTNER?: NO

## 2024-09-25 SDOH — HEALTH STABILITY: MENTAL HEALTH
HOW OFTEN DO YOU NEED TO HAVE SOMEONE HELP YOU WHEN YOU READ INSTRUCTIONS, PAMPHLETS, OR OTHER WRITTEN MATERIAL FROM YOUR DOCTOR OR PHARMACY?: NEVER

## 2024-09-25 SDOH — SOCIAL STABILITY: SOCIAL INSECURITY: WITHIN THE LAST YEAR, HAVE YOU BEEN HUMILIATED OR EMOTIONALLY ABUSED IN OTHER WAYS BY YOUR PARTNER OR EX-PARTNER?: NO

## 2024-09-25 SDOH — ECONOMIC STABILITY: FOOD INSECURITY: WITHIN THE PAST 12 MONTHS, YOU WORRIED THAT YOUR FOOD WOULD RUN OUT BEFORE YOU GOT MONEY TO BUY MORE.: NEVER TRUE

## 2024-09-25 SDOH — SOCIAL STABILITY: SOCIAL NETWORK: HOW OFTEN DO YOU GET TOGETHER WITH FRIENDS OR RELATIVES?: THREE TIMES A WEEK

## 2024-09-25 SDOH — SOCIAL STABILITY: SOCIAL INSECURITY
WITHIN THE LAST YEAR, HAVE YOU BEEN KICKED, HIT, SLAPPED, OR OTHERWISE PHYSICALLY HURT BY YOUR PARTNER OR EX-PARTNER?: NO

## 2024-09-25 SDOH — ECONOMIC STABILITY: INCOME INSECURITY: IN THE PAST 12 MONTHS, HAS THE ELECTRIC, GAS, OIL, OR WATER COMPANY THREATENED TO SHUT OFF SERVICE IN YOUR HOME?: NO

## 2024-09-25 SDOH — SOCIAL STABILITY: SOCIAL NETWORK: HOW OFTEN DO YOU ATTENT MEETINGS OF THE CLUB OR ORGANIZATION YOU BELONG TO?: 1 TO 4 TIMES PER YEAR

## 2024-09-25 SDOH — SOCIAL STABILITY: SOCIAL NETWORK
DO YOU BELONG TO ANY CLUBS OR ORGANIZATIONS SUCH AS CHURCH GROUPS UNIONS, FRATERNAL OR ATHLETIC GROUPS, OR SCHOOL GROUPS?: YES

## 2024-09-25 SDOH — HEALTH STABILITY: PHYSICAL HEALTH: ON AVERAGE, HOW MANY MINUTES DO YOU ENGAGE IN EXERCISE AT THIS LEVEL?: 20 MIN

## 2024-09-25 SDOH — HEALTH STABILITY: MENTAL HEALTH
STRESS IS WHEN SOMEONE FEELS TENSE, NERVOUS, ANXIOUS, OR CAN'T SLEEP AT NIGHT BECAUSE THEIR MIND IS TROUBLED. HOW STRESSED ARE YOU?: NOT AT ALL

## 2024-09-25 SDOH — SOCIAL STABILITY: SOCIAL NETWORK: ARE YOU MARRIED, WIDOWED, DIVORCED, SEPARATED, NEVER MARRIED, OR LIVING WITH A PARTNER?: PATIENT DECLINED

## 2024-09-25 SDOH — HEALTH STABILITY: PHYSICAL HEALTH: ON AVERAGE, HOW MANY DAYS PER WEEK DO YOU ENGAGE IN MODERATE TO STRENUOUS EXERCISE (LIKE A BRISK WALK)?: 2 DAYS

## 2024-09-25 SDOH — ECONOMIC STABILITY: INCOME INSECURITY: HOW HARD IS IT FOR YOU TO PAY FOR THE VERY BASICS LIKE FOOD, HOUSING, MEDICAL CARE, AND HEATING?: NOT HARD AT ALL

## 2024-09-25 ASSESSMENT — PAIN DESCRIPTION - DESCRIPTORS
DESCRIPTORS: DISCOMFORT
DESCRIPTORS: ACHING;DISCOMFORT

## 2024-09-25 ASSESSMENT — PAIN - FUNCTIONAL ASSESSMENT
PAIN_FUNCTIONAL_ASSESSMENT: 0-10
PAIN_FUNCTIONAL_ASSESSMENT: WONG-BAKER FACES
PAIN_FUNCTIONAL_ASSESSMENT: 0-10
PAIN_FUNCTIONAL_ASSESSMENT: 0-10

## 2024-09-25 ASSESSMENT — COGNITIVE AND FUNCTIONAL STATUS - GENERAL
CLIMB 3 TO 5 STEPS WITH RAILING: A LITTLE
MOBILITY SCORE: 23
DAILY ACTIVITIY SCORE: 24

## 2024-09-25 ASSESSMENT — PAIN SCALES - GENERAL
PAINLEVEL_OUTOF10: 6
PAINLEVEL_OUTOF10: 0 - NO PAIN
PAINLEVEL_OUTOF10: 4

## 2024-09-25 ASSESSMENT — PAIN SCALES - WONG BAKER: WONGBAKER_NUMERICALRESPONSE: NO HURT

## 2024-09-25 ASSESSMENT — PAIN SCALES - PAIN ASSESSMENT IN ADVANCED DEMENTIA (PAINAD): TOTALSCORE: MEDICATION (SEE MAR)

## 2024-09-25 ASSESSMENT — PAIN DESCRIPTION - LOCATION: LOCATION: ABDOMEN

## 2024-09-25 NOTE — PROGRESS NOTES
Dietary Rounds and Nutrition Education    Tennis was walking the montes upon arrival. He reports consuming 7 oz of fluids so far and tolerating them well. He reports some nausea- we reviewed taking smaller sips to help. Pt with no other questions/concerns for me.    Thin liquid diet education was provided and packet was given to patient. Reviewed the importance of consuming at least 50 oz of fluid per day once discharged. Patient was instructed to start protein shake tomorrow evening once 40-50 oz of fluid was consumed. Informed patient to gradually work way up to 50g protein per day. Also reviewed things to avoid at this time including carbonation, alcohol, sugar, jello, straws, and gum. Patient was instructed to follow liquid diet for a full 2 weeks, and to not progress diet until instructed to do so. Patient shows good understanding of education provided.        Patricia Anderson RD, LDN  Registered Dietitian, Licensed Dietitian Nutritionist

## 2024-09-25 NOTE — PROGRESS NOTES
Care transitions not consulted, pt anticipated to dc home without further skilled needs. Please consult if needed.      09/25/24 0908   Discharge Planning   Expected Discharge Disposition Home

## 2024-09-25 NOTE — CARE PLAN
The patient's goals for the shift include To sit in the chair and walk later    The clinical goals for the shift include pain control      Problem: Pain  Goal: Takes deep breaths with improved pain control throughout the shift  Outcome: Progressing  Goal: Turns in bed with improved pain control throughout the shift  Outcome: Progressing  Goal: Walks with improved pain control throughout the shift  Outcome: Progressing  Goal: Performs ADL's with improved pain control throughout shift  Outcome: Progressing  Goal: Participates in PT with improved pain control throughout the shift  Outcome: Progressing  Goal: Free from opioid side effects throughout the shift  Outcome: Progressing  Goal: Free from acute confusion related to pain meds throughout the shift  Outcome: Progressing     Problem: Skin  Goal: Decreased wound size/increased tissue granulation at next dressing change  Outcome: Progressing  Flowsheets (Taken 9/25/2024 1530)  Decreased wound size/increased tissue granulation at next dressing change:   Promote sleep for wound healing   Utilize specialty bed per algorithm   Protective dressings over bony prominences  Goal: Participates in plan/prevention/treatment measures  Outcome: Progressing  Flowsheets (Taken 9/25/2024 1530)  Participates in plan/prevention/treatment measures:   Discuss with provider PT/OT consult   Elevate heels   Increase activity/out of bed for meals  Goal: Prevent/manage excess moisture  Outcome: Progressing  Flowsheets (Taken 9/25/2024 1530)  Prevent/manage excess moisture:   Cleanse incontinence/protect with barrier cream   Moisturize dry skin   Use wicking fabric (obtain order)   Follow provider orders for dressing changes   Monitor for/manage infection if present  Goal: Prevent/minimize sheer/friction injuries  Outcome: Progressing  Flowsheets (Taken 9/25/2024 1530)  Prevent/minimize sheer/friction injuries:   Complete micro-shifts as needed if patient unable. Adjust patient position to  relieve pressure points, not a full turn   Increase activity/out of bed for meals   Use pull sheet   HOB 30 degrees or less   Turn/reposition every 2 hours/use positioning/transfer devices   Utilize specialty bed per algorithm  Goal: Promote/optimize nutrition  Outcome: Progressing  Flowsheets (Taken 9/25/2024 1530)  Promote/optimize nutrition:   Assist with feeding   Consume > 50% meals/supplements   Offer water/supplements/favorite foods  Goal: Promote skin healing  Outcome: Progressing  Flowsheets (Taken 9/25/2024 1530)  Promote skin healing:   Assess skin/pad under line(s)/device(s)   Protective dressings over bony prominences   Turn/reposition every 2 hours/use positioning/transfer devices   Ensure correct size (line/device) and apply per  instructions   Rotate device position/do not position patient on device     Problem: Fall/Injury  Goal: Not fall by end of shift  Outcome: Progressing  Goal: Be free from injury by end of the shift  Outcome: Progressing  Goal: Verbalize understanding of personal risk factors for fall in the hospital  Outcome: Progressing  Goal: Verbalize understanding of risk factor reduction measures to prevent injury from fall in the home  Outcome: Progressing  Goal: Use assistive devices by end of the shift  Outcome: Progressing  Goal: Pace activities to prevent fatigue by end of the shift  Outcome: Progressing     Problem: Pain - Adult  Goal: Verbalizes/displays adequate comfort level or baseline comfort level  Outcome: Progressing     Problem: Safety - Adult  Goal: Free from fall injury  Outcome: Progressing     Problem: Discharge Planning  Goal: Discharge to home or other facility with appropriate resources  Outcome: Progressing     Problem: Chronic Conditions and Co-morbidities  Goal: Patient's chronic conditions and co-morbidity symptoms are monitored and maintained or improved  Outcome: Progressing

## 2024-09-25 NOTE — PROGRESS NOTES
"Angelito Burgos \"HANK\" is a 34 y.o. male on day 1 of admission presenting with Morbid obesity (Multi).    Subjective   TJ was up in the chair. He denies chest pain or SOB. He has minimal incisional discomfort. He is tolerating his diet.        Objective     Physical Exam  Constitutional:       Appearance: Normal appearance.   Cardiovascular:      Rate and Rhythm: Normal rate.   Pulmonary:      Effort: Pulmonary effort is normal.   Abdominal:      Palpations: Abdomen is soft.      Tenderness: There is no abdominal tenderness.      Comments: Dressing and incisions intact.    Neurological:      Mental Status: He is alert and oriented to person, place, and time.         Last Recorded Vitals  Blood pressure 132/71, pulse 66, temperature 37.1 °C (98.8 °F), temperature source Oral, resp. rate 18, height 1.715 m (5' 7.52\"), weight 128 kg (281 lb 12 oz), SpO2 97%.  Intake/Output last 3 Shifts:  I/O last 3 completed shifts:  In: 4260.2 (33.3 mL/kg) [I.V.:3960.2 (31 mL/kg); IV Piggyback:300]  Out: 2650 (20.7 mL/kg) [Urine:2650 (0.6 mL/kg/hr)]  Weight: 127.8 kg     Relevant Results  UGI WNL    Assessment/Plan   Assessment & Plan  Morbid obesity (Multi)    Morbid obesity with BMI of 40.0-44.9, adult (Multi)    TJ and I reviewed the rate at which to drink his fluids and his fluid goal for the day. I encouraged him to continue ambulating and use his IS every hour.        I spent 15 minutes in the professional and overall care of this patient.      Lien Jordan, APRN-CNP      "

## 2024-09-25 NOTE — DISCHARGE SUMMARY
Discharge Diagnosis  Morbid obesity (Multi)    Issues Requiring Follow-Up  Follow up in 2 weeks in the office    Test Results Pending At Discharge  Pending Labs       Order Current Status    Surgical Pathology Exam In process            Hospital Course  TJ had Laparoscopic Vertical Sleeve Gastrectomy on 9/24. He had an UGI on 9/25 and was started on a diet. He was discharged to home.     Pertinent Physical Exam At Time of Discharge  Physical Exam  Constitutional:       Appearance: Normal appearance.   Cardiovascular:      Rate and Rhythm: Normal rate.   Pulmonary:      Effort: Pulmonary effort is normal.   Abdominal:      Palpations: Abdomen is soft.      Tenderness: There is no abdominal tenderness.      Comments: Dressing and incisions intact.    Neurological:      Mental Status: He is alert and oriented to person, place, and time.         Home Medications     Medication List      START taking these medications     acetaminophen 325 mg/10.15 mL oral liquid; Commonly known as: Tylenol;   Take 20.3 mL (650 mg) by mouth every 6 hours if needed (may give for pain   with or without oxycodone). Take over the counter liquid tylenol as   needed.   oxyCODONE 5 mg/5 mL solution; Commonly known as: Roxicodone; Take 5 mL   (5 mg) by mouth every 6 hours if needed for severe pain (7 - 10) for up to   3 days.     CONTINUE taking these medications     amLODIPine 5 mg tablet; Commonly known as: Norvasc; Take 1 tablet (5 mg)   by mouth once daily.   levothyroxine 50 mcg tablet; Commonly known as: Synthroid, Levoxyl; Take   1 tablet (50 mcg) by mouth once daily.   omeprazole 40 mg DR capsule; Commonly known as: PriLOSEC; Take 1 capsule   (40 mg) by mouth once daily.     STOP taking these medications     cholecalciferol 1,250 mcg (50,000 unit) tablet; Commonly known as:   Vitamin D3   triamterene-hydrochlorothiazid 37.5-25 mg tablet; Commonly known as:   Maxzide-25       Outpatient Follow-Up  Future Appointments   Date Time Provider  [facility]   10/7/2024 11:00 AM Lien Jordan, APRN-CNP [id number] [place]   10/7/2024 11:30 AM Patricia Anderson RDN, ALYSS[place] [id number] [place]   10/21/2024  9:00 AM Lien Jordan APRN-CNP [id number] [place]   10/21/2024  9:30 AM Patricia Anderson RDN, ALYSSIA [id number] [place]   11/4/2024  3:30 PM Lien Jordan APRN-CNP [id number] [place]   11/4/2024  4:00 PM Patricia Anderson RDN, ALYSSIA [id number] [facility]       SHAHEEN Evans-CNP

## 2024-09-25 NOTE — DISCHARGE INSTRUCTIONS
No bending, pushing, pulling, lifting more than 10lbs, twisting for 6 weeks. Walk at least every hour during the day.    Face away from the water when showering, pat incision dry. Do not submerge incision until okayed by office. Do not rub any lotions or moisturizers on incisions.    Take sips of noncarbonated, low calorie, low to no sugar liquids every 3 to 5 minutes for a goal of 50- 60 oz per day (6-8 cups). Add 1 protein shake per day beginning in the evening 1-2 days after discharge. Increase to 2 protein shakes per day in the second week for a goal of 50-60 grams of protein per day. Make it the last liquid of the day. NO straws, gum, jello, milkshakes, or icecream.    If no CPAP at home, sleep upright and do not take any narcotics for several hours prior to napping or sleeping    Crush all medications and open all capsules for 6 weeks. Mix the beads for a capsule in milk or a protein shake, not water, to prevent clumping. Do not drive while taking pain medication. You can take the patch off behind your ear on Friday 9/27. Please make sure you wash your hands right after. Medication residue left on your hands can cause blurred vision.     Follow instructions: Sips of noncarbonated low to no calorie, low to no sugar liquids every three minutes for a goal of 6-8 glasses of fluid per day. Walk for 2-3 minutes every hour. Use the incentive spirometer 10 times per hour while awake. Add one protein shake per day start Thursday or Friday with a goal of 2 shakes per day starting the second week.       Home-going Instructions    #1 Fluids             drink 50 - 65 oz of non-caffeinated fluids daily  #2 Walk               walk 3 - 5 minutes every hour during the day  #3 Spirometer   use the incentive spirometer 10 times and hour during the day  #4 Protein          after drinking 50oz of fluids begin drinking protein    Wound Care  Do not submerge incisions in pool, bath, or hot tub  Do not peel off Dermabond -it will  gradually loosen and fall off  You may shower and pat incisions dry  Do not apply any lotions, creams, or ointments to your incisions  Remove the dressing above your belly button seven days after it has been on  Activity  Do not push, pull, or lift.  Avoid excessive bending and twisting at the waist.  You may walk stairs.  You may sleep in any position that feels comfortable.  You must get up and walk every hour during the day.  If you plan to take a nap during the day, set an alarm for one hour so you will get up and walk around.  Potential Complications   Wound Infection - redness, increased warmth, pain, thick drainage, fever  Blood Clot/Pulmonary Embolism - calf pain or swelling, chest pain, shortness of breath, racing heart, fast breathing  Leak - abdominal pain radiating down the left side (after eating/drinking), fever, fast breathing, or rapid heart rate.   CALL 9-1-1 WITH ANY CHEST PAIN OR SHORTNESS OF BREATH, otherwise call the office with any concerns. [phone]  Medications  All medications need to be crushable, chewable, in liquid form, or open capsules.   CANNOT crush extended release meds.  You must begin taking your stomach acid reducing medication the morning after you are discharged from the hospital.  Start taking your chewable or liquid multivitamin tomorrow.  Diet  Full liquid.  NO CARBONATION.  Must be thin enough to go up a small stirrer-type straw. BUT DO NOT USE STRAWS.  Remember your first goal is to drink at least 50 oz. fluid every day.  Your second goal is to drink a minimum of 50 grams of protein every day.  Refer to your manual for explicit instructions.     Follow-up  2 weeks

## 2024-09-26 ENCOUNTER — APPOINTMENT (OUTPATIENT)
Dept: CARDIOLOGY | Facility: HOSPITAL | Age: 34
End: 2024-09-26
Payer: COMMERCIAL

## 2024-09-26 VITALS
RESPIRATION RATE: 18 BRPM | SYSTOLIC BLOOD PRESSURE: 131 MMHG | BODY MASS INDEX: 42.7 KG/M2 | HEIGHT: 68 IN | OXYGEN SATURATION: 99 % | HEART RATE: 63 BPM | WEIGHT: 281.75 LBS | DIASTOLIC BLOOD PRESSURE: 68 MMHG | TEMPERATURE: 98.8 F

## 2024-09-26 LAB
LABORATORY COMMENT REPORT: NORMAL
PATH REPORT.FINAL DX SPEC: NORMAL
PATH REPORT.GROSS SPEC: NORMAL
PATH REPORT.RELEVANT HX SPEC: NORMAL
PATH REPORT.TOTAL CANCER: NORMAL

## 2024-09-26 PROCEDURE — 93005 ELECTROCARDIOGRAM TRACING: CPT

## 2024-09-26 PROCEDURE — 2500000005 HC RX 250 GENERAL PHARMACY W/O HCPCS: Performed by: INTERNAL MEDICINE

## 2024-09-26 PROCEDURE — 2500000001 HC RX 250 WO HCPCS SELF ADMINISTERED DRUGS (ALT 637 FOR MEDICARE OP): Performed by: SURGERY

## 2024-09-26 PROCEDURE — 2500000001 HC RX 250 WO HCPCS SELF ADMINISTERED DRUGS (ALT 637 FOR MEDICARE OP): Performed by: INTERNAL MEDICINE

## 2024-09-26 PROCEDURE — 2500000004 HC RX 250 GENERAL PHARMACY W/ HCPCS (ALT 636 FOR OP/ED): Performed by: SURGERY

## 2024-09-26 PROCEDURE — 99232 SBSQ HOSP IP/OBS MODERATE 35: CPT | Performed by: INTERNAL MEDICINE

## 2024-09-26 ASSESSMENT — COGNITIVE AND FUNCTIONAL STATUS - GENERAL
DAILY ACTIVITIY SCORE: 24
MOBILITY SCORE: 24

## 2024-09-26 ASSESSMENT — PAIN DESCRIPTION - DESCRIPTORS: DESCRIPTORS: ACHING;DISCOMFORT

## 2024-09-26 ASSESSMENT — PAIN DESCRIPTION - LOCATION: LOCATION: ABDOMEN

## 2024-09-26 ASSESSMENT — PAIN - FUNCTIONAL ASSESSMENT
PAIN_FUNCTIONAL_ASSESSMENT: 0-10
PAIN_FUNCTIONAL_ASSESSMENT: 0-10

## 2024-09-26 ASSESSMENT — PAIN SCALES - GENERAL
PAINLEVEL_OUTOF10: 5 - MODERATE PAIN
PAINLEVEL_OUTOF10: 2

## 2024-09-26 NOTE — NURSING NOTE
"Pt sitting up to side of bed. No complaints. Stated having a BM and flatus a while ago after ambulating in [place]. Slow w/ PO intake. Slept for short periods during shift. Stated feeling \"much improved\". All surgical sites to abdomen remains C/D/I. Call light in reach.   "

## 2024-09-26 NOTE — PROGRESS NOTES
Dietary Rounds and Nutrition Education    Saw TJ in the hospital around 11:45 am, he was sitting up in his chair upon arrival. His mother was present. He reports that he is feeling better. Fluids are going down fine with no issues. He consumed ~11 ounces.     Thin liquid diet education was provided and packet was given to patient yesterday. Reviewed the importance of consuming at least 50 oz of fluid per day once discharged. Patient was instructed to start protein shake tomorrow evening once 40-50 oz of fluid was consumed. Informed patient to gradually work way up to 50g protein per day. Also reviewed things to avoid at this time including carbonation, alcohol, sugar, jello, straws, and gum. Patient was instructed to follow liquid diet for a full 2 weeks, and to not progress diet until instructed to do so. Patient shows good understanding of education provided.       Laura Fernandez, MS, RD, LD

## 2024-09-26 NOTE — PROGRESS NOTES
"Angelito Burgos \"HANK\" is a 34 y.o. male on day 2 of admission presenting with Morbid obesity (Multi).    Subjective   HANK is sitting up in the chair. He denies chest pain or SOB. He has minimal abdominal discomfort. He tells me that he feels much better today than yesterday. He has 5oz of fluid this morning.        Objective     Physical Exam  Constitutional:       Appearance: Normal appearance.   Cardiovascular:      Rate and Rhythm: Normal rate.   Pulmonary:      Effort: Pulmonary effort is normal.   Abdominal:      Palpations: Abdomen is soft.      Tenderness: There is no abdominal tenderness.      Comments: Dressing and incisions intact.    Neurological:      Mental Status: He is alert and oriented to person, place, and time.         Last Recorded Vitals  Blood pressure 115/69, pulse 59, temperature 36.8 °C (98.2 °F), temperature source Oral, resp. rate 16, height 1.715 m (5' 7.52\"), weight 128 kg (281 lb 12 oz), SpO2 95%.  Intake/Output last 3 Shifts:  I/O last 3 completed shifts:  In: 2101 (16.4 mL/kg) [P.O.:320; I.V.:1581 (12.4 mL/kg); IV Piggyback:200]  Out: 4400 (34.4 mL/kg) [Urine:4400 (1 mL/kg/hr)]  Weight: 127.8 kg     Relevant Results      Assessment/Plan   Assessment & Plan  Morbid obesity (Multi)    Morbid obesity with BMI of 40.0-44.9, adult (Multi)    TJ and I reviewed his fluid goals for the day. I reminded him again of the rate at which to drink his fluids. I encouraged him to continue to use his IS and ambulate frequently. He had O2 on at the time. He tells me that he did not need it, but felt more comfortable wearing it while drowsy in the chair.        I spent 15 minutes in the professional and overall care of this patient.      Lien Jordan, APRN-CNP      "

## 2024-09-26 NOTE — PROGRESS NOTES
"Angelito Burgos \"CHANI" is a 34 y.o. male on day 2 of admission presenting with Morbid obesity (Multi).    Subjective   Alert, denies SOB, abdominal pain or nausea, she ambulates, uses IS, drinking fluids       Objective     Physical Exam  Cardiovascular:      Rate and Rhythm: Normal rate and regular rhythm.   Pulmonary:      Breath sounds: Normal breath sounds. No wheezing.   Abdominal:      General: Bowel sounds are normal.   Musculoskeletal:         General: No swelling.         Last Recorded Vitals  Blood pressure 115/69, pulse 59, temperature 36.8 °C (98.2 °F), temperature source Oral, resp. rate 16, height 1.715 m (5' 7.52\"), weight 128 kg (281 lb 12 oz), SpO2 95%.  Intake/Output last 3 Shifts:  I/O last 3 completed shifts:  In: 2101 (16.4 mL/kg) [P.O.:320; I.V.:1581 (12.4 mL/kg); IV Piggyback:200]  Out: 4400 (34.4 mL/kg) [Urine:4400 (1 mL/kg/hr)]  Weight: 127.8 kg     Relevant Results                              Assessment/Plan   Assessment & Plan  Morbid obesity (Multi)    Morbid obesity with BMI of 40.0-44.9, adult (Multi)    HTN- will monitor BP  Sleep apnea- will use APAP       I spent 15 minutes in the professional and overall care of this patient.      Yoly Hyde MD      "

## 2024-09-26 NOTE — CARE PLAN
The patient's goals for the shift include To sit in the chair and walk later    The clinical goals for the shift include pain control      Problem: Pain  Goal: Takes deep breaths with improved pain control throughout the shift  Outcome: Progressing  Goal: Turns in bed with improved pain control throughout the shift  Outcome: Progressing  Goal: Walks with improved pain control throughout the shift  Outcome: Progressing  Goal: Performs ADL's with improved pain control throughout shift  Outcome: Progressing  Goal: Participates in PT with improved pain control throughout the shift  Outcome: Progressing  Goal: Free from opioid side effects throughout the shift  Outcome: Progressing  Goal: Free from acute confusion related to pain meds throughout the shift  Outcome: Progressing     Problem: Skin  Goal: Decreased wound size/increased tissue granulation at next dressing change  Outcome: Progressing  Flowsheets (Taken 9/26/2024 1519)  Decreased wound size/increased tissue granulation at next dressing change:   Promote sleep for wound healing   Protective dressings over bony prominences   Utilize specialty bed per algorithm  Goal: Participates in plan/prevention/treatment measures  Outcome: Progressing  Flowsheets (Taken 9/26/2024 1519)  Participates in plan/prevention/treatment measures:   Discuss with provider PT/OT consult   Elevate heels   Increase activity/out of bed for meals  Goal: Prevent/manage excess moisture  Outcome: Progressing  Flowsheets (Taken 9/26/2024 1519)  Prevent/manage excess moisture:   Cleanse incontinence/protect with barrier cream   Monitor for/manage infection if present   Follow provider orders for dressing changes   Moisturize dry skin   Use wicking fabric (obtain order)  Goal: Prevent/minimize sheer/friction injuries  Outcome: Progressing  Flowsheets (Taken 9/26/2024 1519)  Prevent/minimize sheer/friction injuries:   Increase activity/out of bed for meals   Use pull sheet   Complete micro-shifts  as needed if patient unable. Adjust patient position to relieve pressure points, not a full turn   HOB 30 degrees or less   Turn/reposition every 2 hours/use positioning/transfer devices   Utilize specialty bed per algorithm  Goal: Promote/optimize nutrition  Outcome: Progressing  Flowsheets (Taken 9/26/2024 1519)  Promote/optimize nutrition:   Assist with feeding   Monitor/record intake including meals   Consume > 50% meals/supplements   Offer water/supplements/favorite foods  Goal: Promote skin healing  Outcome: Progressing  Flowsheets (Taken 9/26/2024 1519)  Promote skin healing:   Assess skin/pad under line(s)/device(s)   Protective dressings over bony prominences   Turn/reposition every 2 hours/use positioning/transfer devices   Ensure correct size (line/device) and apply per  instructions   Rotate device position/do not position patient on device     Problem: Fall/Injury  Goal: Not fall by end of shift  Outcome: Progressing  Goal: Be free from injury by end of the shift  Outcome: Progressing  Goal: Verbalize understanding of personal risk factors for fall in the hospital  Outcome: Progressing  Goal: Verbalize understanding of risk factor reduction measures to prevent injury from fall in the home  Outcome: Progressing  Goal: Use assistive devices by end of the shift  Outcome: Progressing  Goal: Pace activities to prevent fatigue by end of the shift  Outcome: Progressing     Problem: Pain - Adult  Goal: Verbalizes/displays adequate comfort level or baseline comfort level  Outcome: Progressing     Problem: Safety - Adult  Goal: Free from fall injury  Outcome: Progressing     Problem: Discharge Planning  Goal: Discharge to home or other facility with appropriate resources  Outcome: Progressing     Problem: Chronic Conditions and Co-morbidities  Goal: Patient's chronic conditions and co-morbidity symptoms are monitored and maintained or improved  Outcome: Progressing

## 2024-09-26 NOTE — PROGRESS NOTES
"Angelito Burgos \"CHANI" is a 34 y.o. male on day 1 of admission presenting with Morbid obesity (Multi).    Subjective   Alert, denies SOB, abdominal pain or nausea, she ambulates, uses IS, drinks fluids       Objective     Physical Exam  Cardiovascular:      Rate and Rhythm: Normal rate and regular rhythm.   Pulmonary:      Breath sounds: Normal breath sounds. No wheezing.   Abdominal:      General: Bowel sounds are normal.   Musculoskeletal:         General: No swelling.         Last Recorded Vitals  Blood pressure 138/70, pulse 62, temperature 36.8 °C (98.2 °F), temperature source Oral, resp. rate 16, height 1.715 m (5' 7.52\"), weight 128 kg (281 lb 12 oz), SpO2 97%.  Intake/Output last 3 Shifts:  I/O last 3 completed shifts:  In: 4260.2 (33.3 mL/kg) [I.V.:3960.2 (31 mL/kg); IV Piggyback:300]  Out: 3950 (30.9 mL/kg) [Urine:3950 (0.9 mL/kg/hr)]  Weight: 127.8 kg     Relevant Results                              Assessment/Plan   Assessment & Plan  Morbid obesity (Multi)    Morbid obesity with BMI of 40.0-44.9, adult (Multi)    GERD- will continue PPI  Sleep apnea- will use APAP       I spent 15 minutes in the professional and overall care of this patient.      Yoly Hyde MD      "

## 2024-09-26 NOTE — CARE PLAN
The patient's goals for the shift include To sit in the chair and walk later    The clinical goals for the shift include paain management

## 2024-09-27 ENCOUNTER — TELEPHONE (OUTPATIENT)
Dept: SURGERY | Facility: CLINIC | Age: 34
End: 2024-09-27
Payer: COMMERCIAL

## 2024-09-27 NOTE — TELEPHONE ENCOUNTER
Bariatric Surg Post-op Call:          Date: 9/27/24         Called by: Patricia Anderson RD, LDN         How many ounces of fluid are you drinking? 19 oz so far today          How many grams of protein are you drinking? None yet          Any intolerances? No          Any vomiting? No          Still taking pain medication? Yes         Increase in incision pain or drainage? No         Are you taking something to prevent blood clots? No         If yes, do you have a plan for INR to be checked? N/A         Are you walking every hour? Yes         Is your first f/u appointment scheduled and when? Yes, on 10/7 at 11am.       Patricia Anderson RD, LDN  Registered Dietitian, Licensed Dietitian Nutritionist

## 2024-10-07 ENCOUNTER — APPOINTMENT (OUTPATIENT)
Dept: SURGERY | Facility: CLINIC | Age: 34
End: 2024-10-07
Payer: COMMERCIAL

## 2024-10-08 ENCOUNTER — NUTRITION (OUTPATIENT)
Dept: SURGERY | Facility: CLINIC | Age: 34
End: 2024-10-08
Payer: COMMERCIAL

## 2024-10-08 ENCOUNTER — OFFICE VISIT (OUTPATIENT)
Dept: SURGERY | Facility: CLINIC | Age: 34
End: 2024-10-08
Payer: COMMERCIAL

## 2024-10-08 VITALS
HEIGHT: 68 IN | WEIGHT: 251 LBS | DIASTOLIC BLOOD PRESSURE: 57 MMHG | SYSTOLIC BLOOD PRESSURE: 121 MMHG | BODY MASS INDEX: 38.04 KG/M2 | HEART RATE: 70 BPM

## 2024-10-08 DIAGNOSIS — Z09 SURGERY FOLLOW-UP EXAMINATION: Primary | ICD-10-CM

## 2024-10-08 PROCEDURE — 1036F TOBACCO NON-USER: CPT

## 2024-10-08 PROCEDURE — 99024 POSTOP FOLLOW-UP VISIT: CPT

## 2024-10-08 PROCEDURE — 3008F BODY MASS INDEX DOCD: CPT

## 2024-10-08 RX ORDER — MUPIROCIN 20 MG/G
OINTMENT TOPICAL
Qty: 30 G | Refills: 0 | Status: SHIPPED | OUTPATIENT
Start: 2024-10-08 | End: 2024-10-18

## 2024-10-08 RX ORDER — URSODIOL 300 MG/1
300 CAPSULE ORAL 2 TIMES DAILY
Qty: 60 CAPSULE | Refills: 6 | Status: SHIPPED | OUTPATIENT
Start: 2024-10-08 | End: 2025-05-06

## 2024-10-08 ASSESSMENT — PATIENT HEALTH QUESTIONNAIRE - PHQ9
SUM OF ALL RESPONSES TO PHQ9 QUESTIONS 1 AND 2: 0
2. FEELING DOWN, DEPRESSED OR HOPELESS: NOT AT ALL
1. LITTLE INTEREST OR PLEASURE IN DOING THINGS: NOT AT ALL

## 2024-10-08 ASSESSMENT — COLUMBIA-SUICIDE SEVERITY RATING SCALE - C-SSRS
2. HAVE YOU ACTUALLY HAD ANY THOUGHTS OF KILLING YOURSELF?: NO
6. HAVE YOU EVER DONE ANYTHING, STARTED TO DO ANYTHING, OR PREPARED TO DO ANYTHING TO END YOUR LIFE?: NO
1. IN THE PAST MONTH, HAVE YOU WISHED YOU WERE DEAD OR WISHED YOU COULD GO TO SLEEP AND NOT WAKE UP?: NO

## 2024-10-08 ASSESSMENT — PAIN SCALES - GENERAL: PAINLEVEL: 1

## 2024-10-08 NOTE — PROGRESS NOTES
2 WK POST OP SLEEVE  START WT:   286    IDEAL WT: 163     START EXCESS: 123    HT: 67.5 in  HPI:     General Comments:          Do you have any difficulties with:  swallowing?  No, nausea?  No, vomiting?  No, pain?  No, heartburn?  No, discomfort?  No, intolerances?  No.   Fluid and Protein Intake:    Fluid intake (ounces):  60-90 Sources:,   Protein intake (grams): 60  Sources:.   Chewable MVI:  Taking as directed?  Yes.   PPI/omeprazole:  Taking as directed/prescribed?  Yes.   Walking:  Every hour?  Yes. Gallbladder:  Removed?  NO  Primary Care Physician:    Have you seen?  No, Have appointment scheduled?  INSTRUCTED TO SCHEDULE      -Bariatric Follow-up:          Receive IV fluids  No. Seen in ER with admission No. Seen ER without admission No. Hospital readmission No.          Medical History:          Objective:        Physical Examination:       Incisions closed. No erythema. No drainage.         Assessment:        Assessment:    1. Surgery follow-up examination - Z09 (Primary)      Plan:        1. Others    Notes:  Advance diet per protocol  Continue PPI until 3 months  Continue MVI with Fe  May increase walking as tolerated, can start cardio if able to compensate increased fluid loss.              Preventive Medicine:      Counseling:  Medication education:  Education:  All new and/or current medications discussed and reviewed including side effects with patient/caregiver, Understanding:  Caregiver/Patient expressed understanding., Adherence:  Barriers to adherence identified and discussed if present. BMI Care goal follow up plan:  Above normal BMI management provided  Dietary management education, guidance, and counseling.           Follow Up: 2 Weeks                   Billing Information:         Visit Code:        Procedure Codes:     2 WK POST OP SLEEVE  HPI:     General Comments:          Do you have any difficulties with:  swallowing?  No, nausea?  No, vomiting?  No, pain?  No, heartburn?  No,  discomfort?  No, intolerances?  No.   Fluid and Protein Intake:    Fluid intake (ounces):      Sources:,   Protein intake (grams):    Sources:.   Chewable MVI:  Taking as directed?  Yes.   PPI/omeprazole:    Taking as directed/prescribed?  Yes.   Walking:  Every hour?  Yes.   Gallbladder:  Removed?  No.   Primary Care Physician:  Have you seen?  No,   Have appointment scheduled?  Yes.      -Bariatric Follow-up:          Receive IV fluids  No. Seen in ER with admission No. Seen ER without admission No. Hospital readmission No.          Medical History:          Objective:        Physical Examination:       Incisions closed. No erythema. No drainage.         Assessment:        Assessment:    1. Surgery follow-up examination - Z09 (Primary)      Plan:        1. Others    Notes:  Advance diet per protocol  Continue PPI until 3 months  Continue MVI with Fe  May increase walking as tolerated, can start cardio if able to compensate increased fluid loss.              Preventive Medicine:      Counseling:  Medication education:  Education:  All new and/or current medications discussed and reviewed including side effects with patient/caregiver, Understanding:  Caregiver/Patient expressed understanding., Adherence:  Barriers to adherence identified and discussed if present. BMI Care goal follow up plan:  Above normal BMI management provided  Dietary management education, guidance, and counseling.           Follow Up: 2 Weeks                   Billing Information:         Visit Code:        Procedure Codes:

## 2024-10-08 NOTE — PROGRESS NOTES
"Subjective   Patient ID: Angelito Burgos \"TJ\" is a 34 y.o. male who presents for Post-op (2 WK POST OP SLEEVE).  HPI  TJ is here for his 2 week follow up. He denies reflux. He is drinking about 60-80oz of fluid per day. He is getting 60g of protein per day. He is walking all the time. He is currently taking 2 tablets of the flintstones MVI and omeprazole. He has a gallbladder. He tells me that he noticed some drainage from one of his incisions. He denies warmth or discomfort around this incision.     Objective   Physical Exam  Constitutional:       General: He is not in acute distress.     Appearance: Normal appearance. He is obese.   HENT:      Head: Normocephalic.   Eyes:      Pupils: Pupils are equal, round, and reactive to light.   Cardiovascular:      Rate and Rhythm: Normal rate and regular rhythm.   Pulmonary:      Effort: Pulmonary effort is normal.   Abdominal:      General: There is no distension.      Palpations: Abdomen is soft.      Comments: Appropriately TTP around incisions, incisions CDI and closed with glue   Musculoskeletal:         General: Normal range of motion.   Skin:     General: Skin is warm and dry.   Neurological:      Mental Status: He is alert and oriented to person, place, and time.   Psychiatric:         Mood and Affect: Mood normal.         Assessment/Plan   Problem List Items Addressed This Visit             ICD-10-CM    Surgery follow-up examination - Primary Z09    Relevant Medications    ursodiol (Actigall) 300 mg capsule    mupirocin (Bactroban) 2 % ointment     Start ursodiol twice daily. Mix with sugar free jam or jelly a few days after starting pureed diet. Continue activity restrictions. Continue to crush pills. May face shower. Advance diet per protocol. Continue MVI with Fe, PPI. Reviewed the \"rules\" for long term weight loss success. Continue to increase walking for exercise.    There is a puss looking substance near his liver retractor incision. We discussed starting with " a topical antibiotic. He will call the office in a few days if the topical ABX is not helping. We will then move to an oral ABX if necessary. TJ agrees with the plan.      SHAHEEN Evans-CNP 10/08/24 9:18 AM

## 2024-10-08 NOTE — PROGRESS NOTES
2 week Post-op Appointment - Dietary Follow Up     Current Weight: 251 lbs   Current BMI: 38.73    Fluid intake:  ounces per day - flavored water, water, gatorade zero   Protein intake: 60 grams of protein - 2 protein shakes per day    Reviewed pureed diet progression. Patient was instructed to start purees on Wed 10/9. I reviewed how to blend foods appropriately to one consistency using a . Pureed food examples and recipes were provided in packet, reviewed foods to avoid at this time. Informed patient to measure out portion sizes and track volume size. Informed patient to aim for at least 60g of protein per day and to add protein shake as needed. Encouraged continuing to prioritize fluids and ensure getting in at least 50-60 oz fluid daily. Reviewed the importance of slowly eating and stop when starting to feel full. Patient was instructed to follow pureed diet for a full 2 weeks, and to not progress diet until instructed to do so. Pureed education packet was given, patient shows good understanding.     Laura Fernandez, MS, RD, LD

## 2024-10-14 NOTE — PROGRESS NOTES
"4 WK POST OP SLEEVE  START WT:   286    IDEAL WT: 163     START EXCESS: 123 HT: 67.5 in    HPI:     General Comments:          Do you have any difficulties with:  swallowing?  No, nausea?  No, vomiting?  No, pain?  No, heartburn?  No, discomfort?  No, intolerances?  No.   Fluid and Protein Intake:  Reviewed fluid and protein log:  Yes,   Fluid intake (ounces):  100 Sources:,   Protein intake (grams):60-80   Sources:.   Daily Diet Recall: ----. Volume of food at a time: pureed. Chewable MVI:  Taking as directed?  Yes. PPI/omeprazole:  Taking as directed/prescribed?  Yes.   Walking:  Every hour?  Yes.   Ursodiol:  Taking as directed/prescribed?  Yes.      -Bariatric Follow-up:          Receive IV fluids  No. Seen in ER with admission No. Seen ER without admission No. Hospital readmission No.          Medical History:          Objective:        Physical Examination:       Incisions healed.         Assessment:        Assessment:    1. Surgery follow-up examination - Z09      Plan:        1. Others    Notes: Advance diet per protocol  Continue MVI with Fe, PPI  Reviewed the \"rules\" for long term weight loss success.  Continue to increase walking for exercise..              Preventive Medicine:      Counseling:  Medication education:  Education:  All new and/or current medications discussed and reviewed including side effects with patient/caregiver, Understanding:  Caregiver/Patient expressed understanding., Adherence:  Barriers to adherence identified and discussed if present. BMI Care goal follow up plan:  Above normal BMI management provided  Dietary management education, guidance, and counseling.           Follow Up: 2 Weeks                   Billing Information:         Visit Code:        Procedure Codes:       "

## 2024-10-21 ENCOUNTER — APPOINTMENT (OUTPATIENT)
Dept: SURGERY | Facility: CLINIC | Age: 34
End: 2024-10-21
Payer: COMMERCIAL

## 2024-10-21 ENCOUNTER — OFFICE VISIT (OUTPATIENT)
Dept: PRIMARY CARE | Facility: CLINIC | Age: 34
End: 2024-10-21
Payer: COMMERCIAL

## 2024-10-21 VITALS
SYSTOLIC BLOOD PRESSURE: 115 MMHG | HEIGHT: 68 IN | HEART RATE: 59 BPM | DIASTOLIC BLOOD PRESSURE: 58 MMHG | WEIGHT: 245 LBS | BODY MASS INDEX: 37.13 KG/M2

## 2024-10-21 VITALS
HEART RATE: 58 BPM | SYSTOLIC BLOOD PRESSURE: 110 MMHG | BODY MASS INDEX: 37.44 KG/M2 | WEIGHT: 247 LBS | RESPIRATION RATE: 16 BRPM | HEIGHT: 68 IN | DIASTOLIC BLOOD PRESSURE: 60 MMHG | OXYGEN SATURATION: 97 %

## 2024-10-21 DIAGNOSIS — Z09 SURGERY FOLLOW-UP EXAMINATION: Primary | ICD-10-CM

## 2024-10-21 DIAGNOSIS — G47.33 OBSTRUCTIVE SLEEP APNEA: ICD-10-CM

## 2024-10-21 DIAGNOSIS — R03.0 ELEVATED BLOOD PRESSURE READING: Primary | ICD-10-CM

## 2024-10-21 PROCEDURE — 3008F BODY MASS INDEX DOCD: CPT

## 2024-10-21 PROCEDURE — 1036F TOBACCO NON-USER: CPT

## 2024-10-21 PROCEDURE — 3008F BODY MASS INDEX DOCD: CPT | Performed by: INTERNAL MEDICINE

## 2024-10-21 PROCEDURE — 99213 OFFICE O/P EST LOW 20 MIN: CPT | Performed by: INTERNAL MEDICINE

## 2024-10-21 PROCEDURE — 99024 POSTOP FOLLOW-UP VISIT: CPT

## 2024-10-21 RX ORDER — SULFAMETHOXAZOLE AND TRIMETHOPRIM 800; 160 MG/1; MG/1
1 TABLET ORAL 2 TIMES DAILY
Qty: 28 TABLET | Refills: 0 | Status: SHIPPED | OUTPATIENT
Start: 2024-10-21 | End: 2024-11-04

## 2024-10-21 ASSESSMENT — ENCOUNTER SYMPTOMS
NERVOUS/ANXIOUS: 0
FATIGUE: 0
SORE THROAT: 0
COUGH: 0
NAUSEA: 0
JOINT SWELLING: 0
ABDOMINAL PAIN: 0
SLEEP DISTURBANCE: 0
SHORTNESS OF BREATH: 0
VOMITING: 0
DEPRESSION: 0
DIFFICULTY URINATING: 0
DYSURIA: 0
CHILLS: 0
PALPITATIONS: 0
HEMATURIA: 0
RHINORRHEA: 0
DIARRHEA: 0
CONSTIPATION: 0
OCCASIONAL FEELINGS OF UNSTEADINESS: 0
LOSS OF SENSATION IN FEET: 0
ARTHRALGIAS: 0
HEADACHES: 0
SINUS PAIN: 0
WEAKNESS: 0
FEVER: 0
FREQUENCY: 0
APPETITE CHANGE: 0
DIZZINESS: 0

## 2024-10-21 ASSESSMENT — PATIENT HEALTH QUESTIONNAIRE - PHQ9
1. LITTLE INTEREST OR PLEASURE IN DOING THINGS: NOT AT ALL
1. LITTLE INTEREST OR PLEASURE IN DOING THINGS: NOT AT ALL
2. FEELING DOWN, DEPRESSED OR HOPELESS: NOT AT ALL
SUM OF ALL RESPONSES TO PHQ9 QUESTIONS 1 AND 2: 0
2. FEELING DOWN, DEPRESSED OR HOPELESS: NOT AT ALL
SUM OF ALL RESPONSES TO PHQ9 QUESTIONS 1 AND 2: 0

## 2024-10-21 ASSESSMENT — PAIN SCALES - GENERAL
PAINLEVEL_OUTOF10: 0-NO PAIN
PAINLEVEL_OUTOF10: 0-NO PAIN

## 2024-10-21 NOTE — PROGRESS NOTES
4 week Post-op Appointment - Dietary Follow Up     Current Weight: 245 lbs   Current BMI: 37.81     Reviewed soft food diet progression. Patient was instructed to start soft food stage 1 on Wednesday, 10/23. I reviewed which foods patient can start to incorporate and which foods to avoid. Instructed to start soft food stage 2 on Wednesday, 10/30. Informed patient to measure out portion sizes and track volume size. Informed patient to aim for at least 60g of protein per day and to add protein shake as needed. Encouraged continuing to prioritize fluids and ensure getting in at least 50-60 oz fluid daily. Reviewed the importance of slowly eating and stop when starting to feel full. Patient was instructed to follow the soft diet for a full 2 weeks, and to not progress diet until instructed to do so. Soft food education packet was given, patient shows good understanding.         Patricia Anderson RD, LDN  Registered Dietitian, Licensed Dietitian Nutritionist

## 2024-10-21 NOTE — PROGRESS NOTES
"Subjective   Patient ID: Angelito Burgos \"HANK\" is a 34 y.o. male who presents for Post-op (4 wk post op sleeve).  HPI  TJ is here for his 4 week follow up. He denies reflux. He is drinking about 100oz of fluid per day. He is getting 60-80g of protein per day. He is walking all the time. He is currently taking 2 tablets of the flintstones MVI, ursodiol, and omeprazole. His incision still has a white birgit on the inside. He denies warmth or discomfort around this incision.       Objective   Physical Exam  Constitutional:       General: He is not in acute distress.     Appearance: Normal appearance. He is obese.   HENT:      Head: Normocephalic.   Eyes:      Pupils: Pupils are equal, round, and reactive to light.   Cardiovascular:      Rate and Rhythm: Normal rate and regular rhythm.   Pulmonary:      Effort: Pulmonary effort is normal.   Abdominal:      General: There is no distension.      Palpations: Abdomen is soft.      Comments: Appropriately TTP around incisions, incisions CDI and closed with glue   Musculoskeletal:         General: Normal range of motion.   Skin:     General: Skin is warm and dry.   Neurological:      Mental Status: He is alert and oriented to person, place, and time.   Psychiatric:         Mood and Affect: Mood normal.         Assessment/Plan   Problem List Items Addressed This Visit             ICD-10-CM    Surgery follow-up examination - Primary Z09    Relevant Medications    sulfamethoxazole-trimethoprim (Bactrim DS) 800-160 mg tablet     Continue activity restrictions. Continue to crush pills. Advance diet per protocol. Continue MVI with Fe, PPI. Reviewed the \"rules\" for long term weight loss success. Continue to increase walking for exercise.    HANK and I discussed that although his incision did not look bad, it did not look as good as I would have hoped. We reviewed that he would take oral antibiotics for 2 weeks. He would crush the medication and mix with a food on his diet.     Lien TORRES" SHAHEEN Jordan-CNP 10/21/24 9:37 AM

## 2024-11-04 ENCOUNTER — APPOINTMENT (OUTPATIENT)
Dept: SURGERY | Facility: CLINIC | Age: 34
End: 2024-11-04
Payer: COMMERCIAL

## 2024-11-06 ENCOUNTER — NUTRITION (OUTPATIENT)
Dept: SURGERY | Facility: CLINIC | Age: 34
End: 2024-11-06
Payer: COMMERCIAL

## 2024-11-06 NOTE — PROGRESS NOTES
6 week Post-op Appointment - Dietary Follow Up    No wt taken due to virtual appt.     Reviewed 6 week diet progression. I reviewed which foods patient can start to incorporate and which foods to avoid. I reviewed timeline with patient over the next 6 weeks on which foods can be reintroduced back into the diet. Informed patient to measure out portion sizes and track volume size. Informed patient to aim for at least 60g of protein per day and continue to prioritize fluids and ensure getting in at least 50-60 oz fluid daily. Reviewed the importance of slowly eating and stop when starting to feel full. 6 week diet education packet was emailed to pt. Reviewed Vitamin and Mineral supplementation to start taking at 6 weeks post-op in addition to daily MVI. Patient shows good understanding.         Patricia Anderson RD, LDN  Registered Dietitian, Licensed Dietitian Nutritionist

## 2024-11-08 ENCOUNTER — OFFICE VISIT (OUTPATIENT)
Dept: SURGERY | Facility: CLINIC | Age: 34
End: 2024-11-08
Payer: COMMERCIAL

## 2024-11-08 VITALS — BODY MASS INDEX: 35.01 KG/M2 | WEIGHT: 231 LBS | HEIGHT: 68 IN

## 2024-11-08 DIAGNOSIS — E21.3 HYPERPARATHYROIDISM (MULTI): ICD-10-CM

## 2024-11-08 DIAGNOSIS — E55.9 VITAMIN D DEFICIENCY: ICD-10-CM

## 2024-11-08 DIAGNOSIS — E53.8 VITAMIN B12 DEFICIENCY: ICD-10-CM

## 2024-11-08 DIAGNOSIS — K90.9 INTESTINAL MALABSORPTION, UNSPECIFIED TYPE (HHS-HCC): ICD-10-CM

## 2024-11-08 DIAGNOSIS — Z09 SURGERY FOLLOW-UP EXAMINATION: Primary | ICD-10-CM

## 2024-11-08 PROCEDURE — 3008F BODY MASS INDEX DOCD: CPT

## 2024-11-08 PROCEDURE — 99024 POSTOP FOLLOW-UP VISIT: CPT

## 2024-11-08 ASSESSMENT — PAIN SCALES - GENERAL: PAINLEVEL_OUTOF10: 0-NO PAIN

## 2024-11-08 NOTE — PROGRESS NOTES
"Subjective   Patient ID: Angelito Burgos \"TJ\" is a 34 y.o. male who presents for No chief complaint on file..  HPI  TJ is 6 weeks post op. He denies reflux. He is drinking about 100oz of fluid per day. He is getting 80-100g of protein per day. He is walking all the time. He is currently taking 2 tablets of the flintstones MVI, ursodiol, and omeprazole.     Objective   Physical Exam  Constitutional:       Appearance: Normal appearance.   HENT:      Head: Normocephalic.   Eyes:      Pupils: Pupils are equal, round, and reactive to light.   Pulmonary:      Effort: Pulmonary effort is normal.   Neurological:      Mental Status: He is alert and oriented to person, place, and time.   Psychiatric:         Mood and Affect: Mood normal.         Behavior: Behavior normal.         Assessment/Plan   Problem List Items Addressed This Visit             ICD-10-CM    Surgery follow-up examination - Primary Z09    Intestinal malabsorption K90.9    Relevant Orders    CBC and Auto Differential    Comprehensive Metabolic Panel    Copper, Blood    Ferritin    Folate    Iron and TIBC    Parathyroid Hormone, Intact    Vitamin B1, Whole Blood    Vitamin B12    Vitamin D 25-Hydroxy,Total (for eval of Vitamin D levels)    Zinc, Serum or Plasma    Hyperparathyroidism (Multi) E21.3    Relevant Orders    Parathyroid Hormone, Intact    Vitamin B12 deficiency E53.8    Relevant Orders    Vitamin B12    Vitamin D deficiency E55.9    Relevant Orders    Vitamin D 25-Hydroxy,Total (for eval of Vitamin D levels)     Start calcium citrate BID,  from multivitamin by at least 2 hours. Activity restrictions lifted. Advance diet per protocol. Continue MVI with Fe, PPI. Reviewed the \"rules\" for long term weight loss success. Continue to increase walking for exercise.    HANK was sent the vitamin handout via Ailyn Jordan, SHAHEEN-CNP 11/08/24 12:55 PM   "

## 2024-11-08 NOTE — PROGRESS NOTES
"6 wk post op sleeve  Chief Complaints:         1. PBS:6 wk f/u.          HPI:     General Comments:          Do you have any difficulties with:    swallowing?  No,   nausea?  No,   vomiting?  No,   pain?  No,   heartburn?  No,   discomfort?  No,   intolerances?  No.   Fluid and Protein Intake:    Reviewed fluid and protein log:  Yes,   Fluid intake (ounces):  100 Sources:,   Protein intake (grams):  80 Sources:.   Daily Diet Recall: ----. Volume of food at a time: soft. Chewable MVI:  Taking as directed?  Yes. PPI/omeprazole:  Taking as directed/prescribed?  Yes. Walking:  Every hour?  Yes. Ursodiol:  Taking as directed/prescribed?  Yes.      -Bariatric Follow-up:          Receive IV fluids  No. Seen in ER with admission No. Seen ER without admission No. Hospital readmission No.           Medical History:          Objective:       Assessment:        Assessment:    1. Surgery follow-up examination - Z09   2. H/O bariatric surgery - Z98.84   3. Abnormal intestinal absorption - K90.9   4. Vitamin D deficiency, unspecified - E55.9   5. B12 deficiency - E53.8   6. Hyperparathyroidism - E21.3      Plan:        1. Others    Notes:  Advance diet per protocol  May swallow whole pills  Reviewed the \"rules\" for long-term weight loss success  Continue PPI, MVI with Fe  Begin micronutrient supplement protocol/handout given  Activity restrictions lifted  Discussed the importance of regular exercise for continued long-term weight loss success  .              Preventive Medicine:      Counseling:  Medication education:  Education:  All new and/or current medications discussed and reviewed including side effects with patient/caregiver, Understanding:  Caregiver/Patient expressed understanding., Adherence:  Barriers to adherence identified and discussed if present. BMI Care goal follow up plan:  Above normal BMI management provided  Dietary management education, guidance, and counseling.           Follow Up: 6 Weeks      "

## 2024-11-09 DIAGNOSIS — E07.9 DISEASE OF THYROID GLAND: ICD-10-CM

## 2024-11-11 RX ORDER — LEVOTHYROXINE SODIUM 50 UG/1
TABLET ORAL
Qty: 90 TABLET | Refills: 3 | Status: SHIPPED | OUTPATIENT
Start: 2024-11-11 | End: 2025-11-11

## 2024-11-20 ENCOUNTER — TELEPHONE (OUTPATIENT)
Dept: SURGERY | Facility: CLINIC | Age: 34
End: 2024-11-20
Payer: COMMERCIAL

## 2024-11-20 NOTE — TELEPHONE ENCOUNTER
PATIENT CALLED OFFICE STATES THAT HIS PHARMACY SAID THAT HIS ORDER FOR THE LEVOTHYROXINE WAS CANCELLED/ PLEASE PLACE A NEW ORDER FOR THE LEVOTHYROXINE/ NOT SURE WHY IT WOULD HAVE BEEN DISCONTINUED.

## 2025-01-07 ENCOUNTER — TELEPHONE (OUTPATIENT)
Dept: SURGERY | Facility: CLINIC | Age: 35
End: 2025-01-07
Payer: COMMERCIAL

## 2025-01-10 NOTE — PROGRESS NOTES
"Subjective   Patient ID: Angelito Burgos \"HANK\" is a 34 y.o. male who presents for No chief complaint on file..  HPI  3.5 MOS FUV SLEEVE  START WT:   286    IDEAL WT: 163 START EXCESS: 123 HT: 67.5 in  Review of Systems  Bariatric Surgery F/U:          Seen at ER after surgery? No.  Hospital admission? No.  Bariatric reoperation? No.  Bariatric intervention? No.  Was anticoagulation initiated for presumed/confirmed Vein Thrombosis/PE? No.  Was an incisional hernia noted on exam? No.  Sleep Apnea? No.  Still using C-PAP? No.  GERD req. meds? No.  Hyperlipidemia? No.  Still taking Cholesterol med? No.  Hypertension? No.  Still taking HTN med? No.  Number of Anti-hypertensive Medications: 0.  Diabetes? No.  Still taking DM med? No.  Current DM medication type: _____.         CONSTITUTIONAL:          Chills No.  Fatigue No.  Fever No.         CARDIOLOGY:          Negative for dizziness, chest pain, palpitations, shortness of breath.         RESPIRATORY:          Negative for chest congestion, cough, wheezing.         GASTROENTEROLOGY:          Food Intolerance No.  Acid reflux No.  Abdominal pain No.  Black stools No.  Constipation No.  Diarrhea No.  Loss of appetite no.  Nausea No.  Vomiting No.         UROLOGY:          Negative for dysuria, urinary urgency, kidney stones.         PSYCHOLOGY:          Negative for depression, anxiety, high stress level.   Objective   Physical Exam    Assessment/Plan            Delaney Alvarado LPN 01/10/25 12:50 PM   "

## 2025-01-11 ENCOUNTER — LAB (OUTPATIENT)
Dept: LAB | Facility: LAB | Age: 35
End: 2025-01-11
Payer: COMMERCIAL

## 2025-01-11 DIAGNOSIS — E55.9 VITAMIN D DEFICIENCY: ICD-10-CM

## 2025-01-11 DIAGNOSIS — E53.8 VITAMIN B12 DEFICIENCY: ICD-10-CM

## 2025-01-11 DIAGNOSIS — E21.3 HYPERPARATHYROIDISM (MULTI): ICD-10-CM

## 2025-01-11 DIAGNOSIS — K90.9 INTESTINAL MALABSORPTION, UNSPECIFIED TYPE (HHS-HCC): ICD-10-CM

## 2025-01-11 LAB
25(OH)D3 SERPL-MCNC: 23 NG/ML (ref 30–100)
ALBUMIN SERPL BCP-MCNC: 4.2 G/DL (ref 3.4–5)
ALP SERPL-CCNC: 58 U/L (ref 33–120)
ALT SERPL W P-5'-P-CCNC: 11 U/L (ref 10–52)
ANION GAP SERPL CALC-SCNC: 13 MMOL/L (ref 10–20)
AST SERPL W P-5'-P-CCNC: 11 U/L (ref 9–39)
BILIRUB SERPL-MCNC: 0.6 MG/DL (ref 0–1.2)
BUN SERPL-MCNC: 8 MG/DL (ref 6–23)
CALCIUM SERPL-MCNC: 9 MG/DL (ref 8.6–10.3)
CHLORIDE SERPL-SCNC: 100 MMOL/L (ref 98–107)
CO2 SERPL-SCNC: 29 MMOL/L (ref 21–32)
CREAT SERPL-MCNC: 0.67 MG/DL (ref 0.5–1.3)
EGFRCR SERPLBLD CKD-EPI 2021: >90 ML/MIN/1.73M*2
FERRITIN SERPL-MCNC: 56 NG/ML (ref 20–300)
FOLATE SERPL-MCNC: 12.7 NG/ML
GLUCOSE SERPL-MCNC: 90 MG/DL (ref 74–99)
IRON SATN MFR SERPL: 27 % (ref 25–45)
IRON SERPL-MCNC: 98 UG/DL (ref 35–150)
POTASSIUM SERPL-SCNC: 4.3 MMOL/L (ref 3.5–5.3)
PROT SERPL-MCNC: 6.6 G/DL (ref 6.4–8.2)
PTH-INTACT SERPL-MCNC: 15.4 PG/ML (ref 18.5–88)
SODIUM SERPL-SCNC: 138 MMOL/L (ref 136–145)
TIBC SERPL-MCNC: 357 UG/DL (ref 240–445)
UIBC SERPL-MCNC: 259 UG/DL (ref 110–370)
VIT B12 SERPL-MCNC: 443 PG/ML (ref 211–911)

## 2025-01-11 PROCEDURE — 82525 ASSAY OF COPPER: CPT

## 2025-01-11 PROCEDURE — 84630 ASSAY OF ZINC: CPT

## 2025-01-11 PROCEDURE — 83540 ASSAY OF IRON: CPT

## 2025-01-11 PROCEDURE — 82728 ASSAY OF FERRITIN: CPT

## 2025-01-11 PROCEDURE — 83550 IRON BINDING TEST: CPT

## 2025-01-11 PROCEDURE — 82746 ASSAY OF FOLIC ACID SERUM: CPT

## 2025-01-11 PROCEDURE — 82607 VITAMIN B-12: CPT

## 2025-01-11 PROCEDURE — 84425 ASSAY OF VITAMIN B-1: CPT

## 2025-01-11 PROCEDURE — 36415 COLL VENOUS BLD VENIPUNCTURE: CPT

## 2025-01-11 PROCEDURE — 80053 COMPREHEN METABOLIC PANEL: CPT

## 2025-01-11 PROCEDURE — 83970 ASSAY OF PARATHORMONE: CPT

## 2025-01-11 PROCEDURE — 82306 VITAMIN D 25 HYDROXY: CPT

## 2025-01-15 LAB
COPPER SERPL-MCNC: 107.5 UG/DL (ref 70–140)
VIT B1 PYROPHOSHATE BLD-SCNC: 124 NMOL/L (ref 70–180)
ZINC SERPL-MCNC: 91.9 UG/DL (ref 60–120)

## 2025-01-16 ENCOUNTER — APPOINTMENT (OUTPATIENT)
Dept: SURGERY | Facility: CLINIC | Age: 35
End: 2025-01-16
Payer: COMMERCIAL

## 2025-01-16 VITALS
HEIGHT: 68 IN | SYSTOLIC BLOOD PRESSURE: 125 MMHG | DIASTOLIC BLOOD PRESSURE: 79 MMHG | BODY MASS INDEX: 32.89 KG/M2 | WEIGHT: 217 LBS | HEART RATE: 64 BPM

## 2025-01-16 DIAGNOSIS — E21.3 HYPERPARATHYROIDISM (MULTI): ICD-10-CM

## 2025-01-16 DIAGNOSIS — Z98.84 H/O BARIATRIC SURGERY: ICD-10-CM

## 2025-01-16 DIAGNOSIS — K90.9 INTESTINAL MALABSORPTION, UNSPECIFIED TYPE (HHS-HCC): Primary | ICD-10-CM

## 2025-01-16 DIAGNOSIS — E55.9 VITAMIN D DEFICIENCY: ICD-10-CM

## 2025-01-16 DIAGNOSIS — E53.8 VITAMIN B12 DEFICIENCY: ICD-10-CM

## 2025-01-16 PROCEDURE — 1036F TOBACCO NON-USER: CPT

## 2025-01-16 PROCEDURE — 3008F BODY MASS INDEX DOCD: CPT

## 2025-01-16 PROCEDURE — 99214 OFFICE O/P EST MOD 30 MIN: CPT

## 2025-01-16 ASSESSMENT — PAIN SCALES - GENERAL: PAINLEVEL_OUTOF10: 0-NO PAIN

## 2025-01-16 ASSESSMENT — COLUMBIA-SUICIDE SEVERITY RATING SCALE - C-SSRS
6. HAVE YOU EVER DONE ANYTHING, STARTED TO DO ANYTHING, OR PREPARED TO DO ANYTHING TO END YOUR LIFE?: NO
1. IN THE PAST MONTH, HAVE YOU WISHED YOU WERE DEAD OR WISHED YOU COULD GO TO SLEEP AND NOT WAKE UP?: NO
2. HAVE YOU ACTUALLY HAD ANY THOUGHTS OF KILLING YOURSELF?: NO

## 2025-01-16 NOTE — PROGRESS NOTES
"Subjective   Patient ID: Angelito Burgos \"HANK\" is a 34 y.o. male who presents for Follow-up (3.5 MOS FUV SLEEVE).  HPI  TJ is here for his 3 month follow up after a VSG. He has lost 56% of his excess weight. He denies reflux. He is eating every few hours. He will eat a quest bar for his snacks. He is walking for exercise for an hour every evening. He is currently taking one tablet of the flintstones MVI with extra iron. He is not taking calcium. He started taking his once weekly vitamin d prescription from prior to surgery.       Objective   Physical Exam  Constitutional:       Appearance: Normal appearance.   Eyes:      Pupils: Pupils are equal, round, and reactive to light.   Cardiovascular:      Rate and Rhythm: Normal rate.   Pulmonary:      Effort: Pulmonary effort is normal.   Abdominal:      Palpations: Abdomen is soft.   Musculoskeletal:         General: Normal range of motion.   Skin:     General: Skin is warm and dry.   Neurological:      General: No focal deficit present.      Mental Status: He is alert and oriented to person, place, and time.   Psychiatric:         Mood and Affect: Mood normal.        Latest Reference Range & Units 01/11/25 09:11   GLUCOSE 74 - 99 mg/dL 90   SODIUM 136 - 145 mmol/L 138   POTASSIUM 3.5 - 5.3 mmol/L 4.3   CHLORIDE 98 - 107 mmol/L 100   Bicarbonate 21 - 32 mmol/L 29   Anion Gap 10 - 20 mmol/L 13   Blood Urea Nitrogen 6 - 23 mg/dL 8   Creatinine 0.50 - 1.30 mg/dL 0.67   EGFR >60 mL/min/1.73m*2 >90   Calcium 8.6 - 10.3 mg/dL 9.0   Albumin 3.4 - 5.0 g/dL 4.2   Alkaline Phosphatase 33 - 120 U/L 58   ALT 10 - 52 U/L 11   AST 9 - 39 U/L 11   Bilirubin Total 0.0 - 1.2 mg/dL 0.6   FERRITIN 20 - 300 ng/mL 56   FOLATE >5.0 ng/mL 12.7   Total Protein 6.4 - 8.2 g/dL 6.6   IRON 35 - 150 ug/dL 98   TIBC 240 - 445 ug/dL 357   UIBC 110 - 370 ug/dL 259   % Saturation 25 - 45 % 27   Vitamin B12 211 - 911 pg/mL 443   Copper 70.0 - 140.0 ug/dL 107.5   Zinc, Serum or Plasma 60.0 - 120.0 ug/dL 91.9 "   Vitamin B1, Whole Blood 70 - 180 nmol/L 124   Parathyroid Hormone, Intact 18.5 - 88.0 pg/mL 15.4 (L)   Vitamin D, 25-Hydroxy, Total 30 - 100 ng/mL 23 (L)     Assessment/Plan   Problem List Items Addressed This Visit             ICD-10-CM    Intestinal malabsorption - Primary K90.9    Relevant Orders    CBC and Auto Differential    Comprehensive Metabolic Panel    Copper, Blood    Ferritin    Folate    Iron and TIBC    Parathyroid Hormone, Intact    Vitamin B1, Whole Blood    Vitamin B12    Vitamin D 25-Hydroxy,Total (for eval of Vitamin D levels)    Zinc, Serum or Plasma    Hyperparathyroidism (Multi) E21.3    Relevant Orders    Parathyroid Hormone, Intact    Vitamin B12 deficiency E53.8    Relevant Orders    Vitamin B12    Vitamin D deficiency E55.9    Relevant Orders    Vitamin D 25-Hydroxy,Total (for eval of Vitamin D levels)    H/O bariatric surgery Z98.84     TJ is doing well with his weight loss. I encouraged him to continue to be physically active. He will continue his once weekly vitamin D prescribed by his PCP until his next appointment. We will recheck his labwork in 3 months at his next follow up.     Lien Jordan, SHAHEEN-CNP 01/16/25 8:59 AM

## 2025-04-23 ENCOUNTER — APPOINTMENT (OUTPATIENT)
Dept: PRIMARY CARE | Facility: CLINIC | Age: 35
End: 2025-04-23
Payer: COMMERCIAL

## 2025-04-24 ENCOUNTER — APPOINTMENT (OUTPATIENT)
Dept: SURGERY | Facility: CLINIC | Age: 35
End: 2025-04-24
Payer: COMMERCIAL

## 2025-04-24 ENCOUNTER — APPOINTMENT (OUTPATIENT)
Dept: PRIMARY CARE | Facility: CLINIC | Age: 35
End: 2025-04-24
Payer: COMMERCIAL

## 2025-04-28 ENCOUNTER — OFFICE VISIT (OUTPATIENT)
Dept: PRIMARY CARE | Facility: CLINIC | Age: 35
End: 2025-04-28
Payer: COMMERCIAL

## 2025-04-28 ENCOUNTER — APPOINTMENT (OUTPATIENT)
Dept: SURGERY | Facility: CLINIC | Age: 35
End: 2025-04-28
Payer: COMMERCIAL

## 2025-04-28 VITALS — BODY MASS INDEX: 30.62 KG/M2 | WEIGHT: 202 LBS | HEIGHT: 68 IN

## 2025-04-28 VITALS
SYSTOLIC BLOOD PRESSURE: 120 MMHG | OXYGEN SATURATION: 97 % | HEIGHT: 68 IN | HEART RATE: 64 BPM | WEIGHT: 203 LBS | BODY MASS INDEX: 30.77 KG/M2 | RESPIRATION RATE: 16 BRPM | DIASTOLIC BLOOD PRESSURE: 70 MMHG

## 2025-04-28 DIAGNOSIS — E53.8 VITAMIN B12 DEFICIENCY: ICD-10-CM

## 2025-04-28 DIAGNOSIS — Z98.84 H/O BARIATRIC SURGERY: ICD-10-CM

## 2025-04-28 DIAGNOSIS — E55.9 VITAMIN D DEFICIENCY: ICD-10-CM

## 2025-04-28 DIAGNOSIS — E03.9 HYPOTHYROIDISM (ACQUIRED): ICD-10-CM

## 2025-04-28 DIAGNOSIS — E21.3 HYPERPARATHYROIDISM (MULTI): ICD-10-CM

## 2025-04-28 DIAGNOSIS — K90.9 INTESTINAL MALABSORPTION, UNSPECIFIED TYPE (HHS-HCC): Primary | ICD-10-CM

## 2025-04-28 DIAGNOSIS — G47.33 OBSTRUCTIVE SLEEP APNEA: Primary | ICD-10-CM

## 2025-04-28 PROCEDURE — 3008F BODY MASS INDEX DOCD: CPT | Performed by: INTERNAL MEDICINE

## 2025-04-28 PROCEDURE — 3008F BODY MASS INDEX DOCD: CPT

## 2025-04-28 PROCEDURE — 99214 OFFICE O/P EST MOD 30 MIN: CPT

## 2025-04-28 PROCEDURE — 99214 OFFICE O/P EST MOD 30 MIN: CPT | Performed by: INTERNAL MEDICINE

## 2025-04-28 ASSESSMENT — ENCOUNTER SYMPTOMS
PALPITATIONS: 0
NAUSEA: 0
SHORTNESS OF BREATH: 0
DEPRESSION: 0
ARTHRALGIAS: 0
OCCASIONAL FEELINGS OF UNSTEADINESS: 0
DIARRHEA: 0
LOSS OF SENSATION IN FEET: 0
COUGH: 0
ABDOMINAL PAIN: 0
CONSTIPATION: 0
DIZZINESS: 0

## 2025-04-28 ASSESSMENT — PAIN SCALES - GENERAL: PAINLEVEL_OUTOF10: 0-NO PAIN

## 2025-04-28 NOTE — PATIENT INSTRUCTIONS
In terms of diet, try to consume 60 grams of protein per day spread out amongst three meals (20 grams of protein per meal). Protein should come from sources such as meat, low fat dairy and eggs. When eating meals, always start with the protein (make you feel full quicker). Recommended using a calorie counting jeaneth such as [employer].  Please resume your supplements of calcium, multivitamin, B12 supplement along with vitamin D

## 2025-04-28 NOTE — PROGRESS NOTES
"Subjective   Patient ID: HANK Burgos is a 34 y.o. male who presents for 6 month check up.    Patient is doing well overall. Pshx of gastric sleeve. Follows a consistent exercise regime and healthy diet.  Energy levels have been normal. Takes vitamin D supplement but stopped the rest of his supplements.  He does not snore anymore since he lost weight so he stopped using his CPAP..  Diagnostics Reviewed:  Labs Reviewed:         Review of Systems   Respiratory:  Negative for cough and shortness of breath.    Cardiovascular:  Negative for chest pain and palpitations.   Gastrointestinal:  Negative for abdominal pain, constipation, diarrhea and nausea.   Musculoskeletal:  Negative for arthralgias.   Neurological:  Negative for dizziness.       Objective   /70   Pulse 64   Resp 16   Ht 1.715 m (5' 7.5\")   Wt 92.1 kg (203 lb)   SpO2 97%   BMI 31.33 kg/m²     Physical Exam  HENT:      Right Ear: Tympanic membrane normal. There is no impacted cerumen.      Left Ear: Tympanic membrane normal. There is no impacted cerumen.   Cardiovascular:      Rate and Rhythm: Normal rate and regular rhythm.      Heart sounds: Normal heart sounds.   Pulmonary:      Breath sounds: Normal breath sounds.   Abdominal:      Palpations: Abdomen is soft. There is no hepatomegaly.      Tenderness: There is no abdominal tenderness.   Musculoskeletal:      Right lower leg: No edema.      Left lower leg: No edema.   Neurological:      Mental Status: He is alert and oriented to person, place, and time.      Gait: Gait normal.   Psychiatric:         Mood and Affect: Mood normal.         Behavior: Behavior normal.       Assessment/Plan   Diagnoses and all orders for this visit:  Obstructive sleep apnea  H/O bariatric surgery  Hypothyroidism (acquired)      Scribe Attestation  By signing my name below, Roman MCQUEEN Scribe   attest that this documentation has been prepared under the direction and in the presence of Yoly Hyde MD.       "

## 2025-04-28 NOTE — PROGRESS NOTES
"Subjective   Patient ID: Angelito Burgos \"HANK\" is a 34 y.o. male who presents for Follow-up.  HPI  HANK is here for his 6 month follow up after a VSG. He has lost 68% of his excess weight. He denies reflux. He is eating every few hours. He is full after a few bites. He is eating lots of fruits and vegetables. He will eat a quest bar everyday. He is walking 3-5 miles of walking almost everyday. He is taking calcium citrate 3-4 times per week. He is not taking any other supplementation. He had his labwork done earlier today. It has not resulted by the time of his appointment.       Objective   Physical Exam  Constitutional:       Appearance: Normal appearance.   Eyes:      Pupils: Pupils are equal, round, and reactive to light.   Cardiovascular:      Rate and Rhythm: Normal rate.   Pulmonary:      Effort: Pulmonary effort is normal.   Abdominal:      Palpations: Abdomen is soft.   Musculoskeletal:         General: Normal range of motion.   Skin:     General: Skin is warm and dry.   Neurological:      General: No focal deficit present.      Mental Status: He is alert and oriented to person, place, and time.   Psychiatric:         Mood and Affect: Mood normal.         Assessment/Plan   Problem List Items Addressed This Visit           ICD-10-CM    Intestinal malabsorption - Primary K90.9    Relevant Orders    CBC and Auto Differential    Comprehensive Metabolic Panel    Copper, Blood    Ferritin    Folate    Iron and TIBC    Parathyroid Hormone, Intact    Vitamin B1, Whole Blood    Vitamin B12    Vitamin D 25-Hydroxy,Total (for eval of Vitamin D levels)    Zinc, Serum or Plasma    Hyperparathyroidism (Multi) E21.3    Relevant Orders    Parathyroid Hormone, Intact    Vitamin B12 deficiency E53.8    Relevant Orders    Vitamin B12    Vitamin D deficiency E55.9    Relevant Orders    Vitamin D 25-Hydroxy,Total (for eval of Vitamin D levels)    H/O bariatric surgery Z98.84     HANK is doing well with his weight loss. I encouraged " him to continue to be physically active. We reviewed proper supplementation after bariatric surgery. He was given a handout. I will message him with abnormal results. We will recheck his labwork in 6 months at his next follow up.     SHAHEEN Evans-CNP 04/28/25 12:42 PM

## 2025-05-02 LAB
25(OH)D3+25(OH)D2 SERPL-MCNC: 36 NG/ML (ref 30–100)
ALBUMIN SERPL-MCNC: 4.4 G/DL (ref 3.6–5.1)
ALP SERPL-CCNC: 42 U/L (ref 36–130)
ALT SERPL-CCNC: 16 U/L (ref 9–46)
ANION GAP SERPL CALCULATED.4IONS-SCNC: 9 MMOL/L (CALC) (ref 7–17)
AST SERPL-CCNC: 16 U/L (ref 10–40)
BASOPHILS # BLD AUTO: 20 CELLS/UL (ref 0–200)
BASOPHILS NFR BLD AUTO: 0.3 %
BILIRUB SERPL-MCNC: 0.6 MG/DL (ref 0.2–1.2)
BUN SERPL-MCNC: 8 MG/DL (ref 7–25)
CALCIUM SERPL-MCNC: 8.9 MG/DL (ref 8.6–10.3)
CHLORIDE SERPL-SCNC: 104 MMOL/L (ref 98–110)
CO2 SERPL-SCNC: 27 MMOL/L (ref 20–32)
COPPER BLD-MCNC: 87 MCG/DL
CREAT SERPL-MCNC: 0.64 MG/DL (ref 0.6–1.26)
EGFRCR SERPLBLD CKD-EPI 2021: 127 ML/MIN/1.73M2
EOSINOPHIL # BLD AUTO: 39 CELLS/UL (ref 15–500)
EOSINOPHIL NFR BLD AUTO: 0.6 %
ERYTHROCYTE [DISTWIDTH] IN BLOOD BY AUTOMATED COUNT: 13.5 % (ref 11–15)
FERRITIN SERPL-MCNC: 30 NG/ML (ref 38–380)
FOLATE SERPL-MCNC: 7.5 NG/ML
GLUCOSE SERPL-MCNC: 80 MG/DL (ref 65–99)
HCT VFR BLD AUTO: 43.7 % (ref 38.5–50)
HGB BLD-MCNC: 14.6 G/DL (ref 13.2–17.1)
IRON SATN MFR SERPL: 26 % (CALC) (ref 20–48)
IRON SERPL-MCNC: 98 MCG/DL (ref 50–180)
LYMPHOCYTES # BLD AUTO: 2503 CELLS/UL (ref 850–3900)
LYMPHOCYTES NFR BLD AUTO: 38.5 %
MCH RBC QN AUTO: 29.2 PG (ref 27–33)
MCHC RBC AUTO-ENTMCNC: 33.4 G/DL (ref 32–36)
MCV RBC AUTO: 87.4 FL (ref 80–100)
MONOCYTES # BLD AUTO: 410 CELLS/UL (ref 200–950)
MONOCYTES NFR BLD AUTO: 6.3 %
NEUTROPHILS # BLD AUTO: 3530 CELLS/UL (ref 1500–7800)
NEUTROPHILS NFR BLD AUTO: 54.3 %
PLATELET # BLD AUTO: 299 THOUSAND/UL (ref 140–400)
PMV BLD REES-ECKER: 10.6 FL (ref 7.5–12.5)
POTASSIUM SERPL-SCNC: 4.4 MMOL/L (ref 3.5–5.3)
PROT SERPL-MCNC: 7.1 G/DL (ref 6.1–8.1)
PTH-INTACT SERPL-MCNC: 21 PG/ML (ref 16–77)
RBC # BLD AUTO: 5 MILLION/UL (ref 4.2–5.8)
SODIUM SERPL-SCNC: 140 MMOL/L (ref 135–146)
TIBC SERPL-MCNC: 376 MCG/DL (CALC) (ref 250–425)
VIT B1 BLD-SCNC: NORMAL NMOL/L
VIT B12 SERPL-MCNC: 439 PG/ML (ref 200–1100)
WBC # BLD AUTO: 6.5 THOUSAND/UL (ref 3.8–10.8)
ZINC SERPL-MCNC: 71 MCG/DL (ref 60–130)

## 2025-05-05 LAB
25(OH)D3+25(OH)D2 SERPL-MCNC: 36 NG/ML (ref 30–100)
ALBUMIN SERPL-MCNC: 4.4 G/DL (ref 3.6–5.1)
ALP SERPL-CCNC: 42 U/L (ref 36–130)
ALT SERPL-CCNC: 16 U/L (ref 9–46)
ANION GAP SERPL CALCULATED.4IONS-SCNC: 9 MMOL/L (CALC) (ref 7–17)
AST SERPL-CCNC: 16 U/L (ref 10–40)
BASOPHILS # BLD AUTO: 20 CELLS/UL (ref 0–200)
BASOPHILS NFR BLD AUTO: 0.3 %
BILIRUB SERPL-MCNC: 0.6 MG/DL (ref 0.2–1.2)
BUN SERPL-MCNC: 8 MG/DL (ref 7–25)
CALCIUM SERPL-MCNC: 8.9 MG/DL (ref 8.6–10.3)
CHLORIDE SERPL-SCNC: 104 MMOL/L (ref 98–110)
CO2 SERPL-SCNC: 27 MMOL/L (ref 20–32)
COPPER BLD-MCNC: 87 MCG/DL
CREAT SERPL-MCNC: 0.64 MG/DL (ref 0.6–1.26)
EGFRCR SERPLBLD CKD-EPI 2021: 127 ML/MIN/1.73M2
EOSINOPHIL # BLD AUTO: 39 CELLS/UL (ref 15–500)
EOSINOPHIL NFR BLD AUTO: 0.6 %
ERYTHROCYTE [DISTWIDTH] IN BLOOD BY AUTOMATED COUNT: 13.5 % (ref 11–15)
FERRITIN SERPL-MCNC: 30 NG/ML (ref 38–380)
FOLATE SERPL-MCNC: 7.5 NG/ML
GLUCOSE SERPL-MCNC: 80 MG/DL (ref 65–99)
HCT VFR BLD AUTO: 43.7 % (ref 38.5–50)
HGB BLD-MCNC: 14.6 G/DL (ref 13.2–17.1)
IRON SATN MFR SERPL: 26 % (CALC) (ref 20–48)
IRON SERPL-MCNC: 98 MCG/DL (ref 50–180)
LYMPHOCYTES # BLD AUTO: 2503 CELLS/UL (ref 850–3900)
LYMPHOCYTES NFR BLD AUTO: 38.5 %
MCH RBC QN AUTO: 29.2 PG (ref 27–33)
MCHC RBC AUTO-ENTMCNC: 33.4 G/DL (ref 32–36)
MCV RBC AUTO: 87.4 FL (ref 80–100)
MONOCYTES # BLD AUTO: 410 CELLS/UL (ref 200–950)
MONOCYTES NFR BLD AUTO: 6.3 %
NEUTROPHILS # BLD AUTO: 3530 CELLS/UL (ref 1500–7800)
NEUTROPHILS NFR BLD AUTO: 54.3 %
PLATELET # BLD AUTO: 299 THOUSAND/UL (ref 140–400)
PMV BLD REES-ECKER: 10.6 FL (ref 7.5–12.5)
POTASSIUM SERPL-SCNC: 4.4 MMOL/L (ref 3.5–5.3)
PROT SERPL-MCNC: 7.1 G/DL (ref 6.1–8.1)
PTH-INTACT SERPL-MCNC: 21 PG/ML (ref 16–77)
RBC # BLD AUTO: 5 MILLION/UL (ref 4.2–5.8)
SODIUM SERPL-SCNC: 140 MMOL/L (ref 135–146)
TIBC SERPL-MCNC: 376 MCG/DL (CALC) (ref 250–425)
VIT B1 BLD-SCNC: 131 NMOL/L (ref 78–185)
VIT B12 SERPL-MCNC: 439 PG/ML (ref 200–1100)
WBC # BLD AUTO: 6.5 THOUSAND/UL (ref 3.8–10.8)
ZINC SERPL-MCNC: 71 MCG/DL (ref 60–130)

## 2025-11-05 ENCOUNTER — APPOINTMENT (OUTPATIENT)
Dept: SURGERY | Facility: CLINIC | Age: 35
End: 2025-11-05
Payer: COMMERCIAL

## 2025-11-17 ENCOUNTER — APPOINTMENT (OUTPATIENT)
Dept: SURGERY | Facility: CLINIC | Age: 35
End: 2025-11-17
Payer: COMMERCIAL

## (undated) DEVICE — APPLICATOR, CHLORAPREP, W/ORANGE TINT, 26ML

## (undated) DEVICE — TROCAR, OPTICAL, BLADELESS, 12MM, THREADED, 100MM LENGTH

## (undated) DEVICE — TROCAR, TITAN SGS, STANDARD 19MM

## (undated) DEVICE — GLOVE, SURGICAL, PROTEXIS PI BLUE W/NEUTHERA, 6.5, PF, LF

## (undated) DEVICE — TROCAR, KII OPTICAL BLADELESS 5MM Z THREAD 100MM LNGTH

## (undated) DEVICE — SUTURE, VICRYL, 2-0, 27 IN, BR/SH 27, VIOLET

## (undated) DEVICE — TROCAR, ENDOPATH EXCEL, 5MM, BLADELESS

## (undated) DEVICE — SPONGE, HEMOSTATIC, CELLULOSE, SURGICEL, 4 X 8 IN

## (undated) DEVICE — Device

## (undated) DEVICE — STAPLER, TITAN SGS RB, 23 CM

## (undated) DEVICE — DRESSING, MEPILEX FOAM BORDER AG, SILVER, 4 X 4

## (undated) DEVICE — WATER STERILE, FOR IRRIGATION, 1000ML, W/HANGER

## (undated) DEVICE — SEALANT, FIBRIN, VISTASEAL HUMAN, 4ML, 12/PK

## (undated) DEVICE — GLOVE, SURGICAL, PROTEXIS PI , 7.0, PF, LF

## (undated) DEVICE — SOLUTION, IRRIGATION, X RX SODIUM CHL 0.9%, 1000ML BTL

## (undated) DEVICE — APPLICATOR, DUAL LAPAROSCOPIC, VISTASEAL, 35CM, RIGID

## (undated) DEVICE — SHEARS, HARMONIC CURVED XLONG 45CM

## (undated) DEVICE — SOLUTION, INJECTION, USP, LACTATED RINGERS, LIFECARE, 1000ML

## (undated) DEVICE — GLOVE, SURGICAL, PROTEXIS PI , 6.5, PF, LF

## (undated) DEVICE — CLIP, ENDO APPLIER LIGAMAX 5MM

## (undated) DEVICE — MARKER, SKIN, VISFLOW BOLD TIP, W/RULER

## (undated) DEVICE — SUTURE, ETHIBOND XTRA, 0, SHSH, 36IN, LF

## (undated) DEVICE — TROCAR, KII OPTICAL SEPARATOR, 8MM X 100MM,  Z-THREAD

## (undated) DEVICE — CLIP, ABSORBABLE, VICRYL, LAPRA-TY, VIOLET

## (undated) DEVICE — DEVICE, STANDARD BOUGIE, 38FR TAPERED

## (undated) DEVICE — SOLUTION, INJECTION, 0.9% SODIUM CHL, USP LIFECARE 1000 MI

## (undated) DEVICE — SCISSORS, HOOK, 5MM X 31CM, DISP

## (undated) DEVICE — TUBE SET, PNEUMOLAR HEATED, SMOKE EVACU, HIGH-FLOW